# Patient Record
Sex: MALE | Race: WHITE | NOT HISPANIC OR LATINO | Employment: UNEMPLOYED | URBAN - METROPOLITAN AREA
[De-identification: names, ages, dates, MRNs, and addresses within clinical notes are randomized per-mention and may not be internally consistent; named-entity substitution may affect disease eponyms.]

---

## 2017-01-03 ENCOUNTER — GENERIC CONVERSION - ENCOUNTER (OUTPATIENT)
Dept: OTHER | Facility: OTHER | Age: 57
End: 2017-01-03

## 2017-01-06 ENCOUNTER — ALLSCRIPTS OFFICE VISIT (OUTPATIENT)
Dept: OTHER | Facility: OTHER | Age: 57
End: 2017-01-06

## 2017-01-06 ENCOUNTER — GENERIC CONVERSION - ENCOUNTER (OUTPATIENT)
Dept: OTHER | Facility: OTHER | Age: 57
End: 2017-01-06

## 2017-01-11 ENCOUNTER — HOSPITAL ENCOUNTER (OUTPATIENT)
Dept: RADIOLOGY | Facility: HOSPITAL | Age: 57
Discharge: HOME | End: 2017-01-11
Admitting: RADIOLOGY
Payer: COMMERCIAL

## 2017-01-11 DIAGNOSIS — T85.598S FEEDING TUBE DYSFUNCTION, SEQUELA: ICD-10-CM

## 2017-01-11 PROCEDURE — 49465 FLUORO EXAM OF G/COLON TUBE: CPT

## 2017-01-11 RX ADMIN — IOHEXOL 8 ML: 300 INJECTION, SOLUTION INTRAVENOUS at 09:49

## 2017-01-13 RX ORDER — SODIUM CHLORIDE 9 MG/ML
20 INJECTION, SOLUTION INTRAVENOUS CONTINUOUS
Status: DISCONTINUED | OUTPATIENT
Start: 2017-01-16 | End: 2017-01-19 | Stop reason: HOSPADM

## 2017-01-15 ENCOUNTER — TRANSCRIBE ORDERS (OUTPATIENT)
Dept: ADMINISTRATIVE | Facility: HOSPITAL | Age: 57
End: 2017-01-15

## 2017-01-15 ENCOUNTER — APPOINTMENT (OUTPATIENT)
Dept: LAB | Facility: MEDICAL CENTER | Age: 57
End: 2017-01-15
Payer: COMMERCIAL

## 2017-01-15 DIAGNOSIS — C15.3 MALIGNANT NEOPLASM OF UPPER THIRD OF ESOPHAGUS (HCC): Primary | ICD-10-CM

## 2017-01-15 DIAGNOSIS — K21.9 GASTRO-ESOPHAGEAL REFLUX DISEASE WITHOUT ESOPHAGITIS: ICD-10-CM

## 2017-01-15 DIAGNOSIS — Z00.00 ENCOUNTER FOR GENERAL ADULT MEDICAL EXAMINATION WITHOUT ABNORMAL FINDINGS: ICD-10-CM

## 2017-01-15 DIAGNOSIS — F41.9 ANXIETY DISORDER: ICD-10-CM

## 2017-01-15 DIAGNOSIS — C15.9 MALIGNANT NEOPLASM OF ESOPHAGUS (HCC): ICD-10-CM

## 2017-01-15 DIAGNOSIS — R74.8 ABNORMAL LEVELS OF OTHER SERUM ENZYMES: ICD-10-CM

## 2017-01-15 LAB
ALBUMIN SERPL BCP-MCNC: 2.8 G/DL (ref 3.5–5)
ALP SERPL-CCNC: 140 U/L (ref 46–116)
ALT SERPL W P-5'-P-CCNC: 18 U/L (ref 12–78)
ANION GAP SERPL CALCULATED.3IONS-SCNC: 6 MMOL/L (ref 4–13)
AST SERPL W P-5'-P-CCNC: 27 U/L (ref 5–45)
BASOPHILS # BLD AUTO: 0.02 THOUSANDS/ΜL (ref 0–0.1)
BASOPHILS NFR BLD AUTO: 0 % (ref 0–1)
BILIRUB SERPL-MCNC: 0.32 MG/DL (ref 0.2–1)
BUN SERPL-MCNC: 9 MG/DL (ref 5–25)
CALCIUM SERPL-MCNC: 8.7 MG/DL (ref 8.3–10.1)
CHLORIDE SERPL-SCNC: 106 MMOL/L (ref 100–108)
CO2 SERPL-SCNC: 29 MMOL/L (ref 21–32)
CREAT SERPL-MCNC: 0.77 MG/DL (ref 0.6–1.3)
EOSINOPHIL # BLD AUTO: 0.01 THOUSAND/ΜL (ref 0–0.61)
EOSINOPHIL NFR BLD AUTO: 0 % (ref 0–6)
ERYTHROCYTE [DISTWIDTH] IN BLOOD BY AUTOMATED COUNT: 15.2 % (ref 11.6–15.1)
GFR SERPL CREATININE-BSD FRML MDRD: >60 ML/MIN/1.73SQ M
GLUCOSE SERPL-MCNC: 107 MG/DL (ref 65–140)
HCT VFR BLD AUTO: 35.3 % (ref 36.5–49.3)
HGB BLD-MCNC: 11 G/DL (ref 12–17)
LYMPHOCYTES # BLD AUTO: 0.92 THOUSANDS/ΜL (ref 0.6–4.47)
LYMPHOCYTES NFR BLD AUTO: 16 % (ref 14–44)
MCH RBC QN AUTO: 29.5 PG (ref 26.8–34.3)
MCHC RBC AUTO-ENTMCNC: 31.2 G/DL (ref 31.4–37.4)
MCV RBC AUTO: 95 FL (ref 82–98)
MONOCYTES # BLD AUTO: 0.57 THOUSAND/ΜL (ref 0.17–1.22)
MONOCYTES NFR BLD AUTO: 10 % (ref 4–12)
NEUTROPHILS # BLD AUTO: 4.32 THOUSANDS/ΜL (ref 1.85–7.62)
NEUTS SEG NFR BLD AUTO: 74 % (ref 43–75)
NRBC BLD AUTO-RTO: 0 /100 WBCS
PLATELET # BLD AUTO: 287 THOUSANDS/UL (ref 149–390)
PMV BLD AUTO: 9.2 FL (ref 8.9–12.7)
POTASSIUM SERPL-SCNC: 4.4 MMOL/L (ref 3.5–5.3)
PROT SERPL-MCNC: 6.7 G/DL (ref 6.4–8.2)
RBC # BLD AUTO: 3.73 MILLION/UL (ref 3.88–5.62)
SODIUM SERPL-SCNC: 141 MMOL/L (ref 136–145)
WBC # BLD AUTO: 5.87 THOUSAND/UL (ref 4.31–10.16)

## 2017-01-15 PROCEDURE — 36415 COLL VENOUS BLD VENIPUNCTURE: CPT

## 2017-01-15 PROCEDURE — 80053 COMPREHEN METABOLIC PANEL: CPT

## 2017-01-15 PROCEDURE — 85025 COMPLETE CBC W/AUTO DIFF WBC: CPT

## 2017-01-16 ENCOUNTER — HOSPITAL ENCOUNTER (OUTPATIENT)
Dept: INFUSION CENTER | Facility: CLINIC | Age: 57
Discharge: HOME | End: 2017-01-16
Payer: COMMERCIAL

## 2017-01-16 ENCOUNTER — ALLSCRIPTS OFFICE VISIT (OUTPATIENT)
Dept: OTHER | Facility: OTHER | Age: 57
End: 2017-01-16

## 2017-01-16 VITALS
HEART RATE: 81 BPM | BODY MASS INDEX: 28.8 KG/M2 | HEIGHT: 69 IN | DIASTOLIC BLOOD PRESSURE: 83 MMHG | TEMPERATURE: 97.8 F | WEIGHT: 194.45 LBS | RESPIRATION RATE: 18 BRPM | SYSTOLIC BLOOD PRESSURE: 140 MMHG

## 2017-01-16 PROCEDURE — 96367 TX/PROPH/DG ADDL SEQ IV INF: CPT

## 2017-01-16 PROCEDURE — 96413 CHEMO IV INFUSION 1 HR: CPT

## 2017-01-16 RX ORDER — DIAZEPAM 2 MG/1
2 TABLET ORAL EVERY 12 HOURS PRN
COMMUNITY

## 2017-01-16 RX ADMIN — DOCETAXEL 151 MG: 20 INJECTION, SOLUTION, CONCENTRATE INTRAVENOUS at 10:45

## 2017-01-16 RX ADMIN — Medication 300 UNITS: at 11:57

## 2017-01-16 RX ADMIN — DEXAMETHASONE SODIUM PHOSPHATE 10 MG: 10 INJECTION INTRAMUSCULAR; INTRAVENOUS at 10:05

## 2017-01-16 RX ADMIN — SODIUM CHLORIDE 20 ML/HR: 0.9 INJECTION, SOLUTION INTRAVENOUS at 10:07

## 2017-01-16 NOTE — PROGRESS NOTES
Patient tolerated chemotherapy infusion without complication  Patient aware of upcoming appointments   Declined AVS

## 2017-01-16 NOTE — PLAN OF CARE
Problem: Potential for Falls  Goal: Patient will remain free of falls  INTERVENTIONS:  - Assess patient frequently for physical needs  - Identify cognitive and physical deficits and behaviors that affect risk of falls  - Bussey fall precautions as indicated by assessment   - Educate patient/family on patient safety including physical limitations  - Instruct patient to call for assistance with activity based on assessment  - Modify environment to reduce risk of injury  - Consider OT/PT consult to assist with strengthening/mobility   Outcome: Progressing    Problem: SAFETY ADULT  Goal: Patient will remain free of falls  INTERVENTIONS:  - Assess patient frequently for physical needs  - Identify cognitive and physical deficits and behaviors that affect risk of falls  - Bussey fall precautions as indicated by assessment   - Educate patient/family on patient safety including physical limitations  - Instruct patient to call for assistance with activity based on assessment  - Modify environment to reduce risk of injury  - Consider OT/PT consult to assist with strengthening/mobility   Outcome: Progressing    Problem: Knowledge Deficit  Goal: Patient/family/caregiver demonstrates understanding of disease process, treatment plan, medications, and discharge instructions  Complete learning assessment and assess knowledge base    Interventions:  - Provide teaching at level of understanding  - Provide teaching via preferred learning methods  Outcome: Progressing

## 2017-01-27 ENCOUNTER — HOSPITAL ENCOUNTER (OUTPATIENT)
Dept: RADIOLOGY | Facility: HOSPITAL | Age: 57
Discharge: HOME | End: 2017-01-27
Attending: INTERNAL MEDICINE
Payer: COMMERCIAL

## 2017-01-27 DIAGNOSIS — T85.598S FEEDING TUBE DYSFUNCTION, SEQUELA: ICD-10-CM

## 2017-01-27 PROCEDURE — 49451 REPLACE DUOD/JEJ TUBE PERC: CPT

## 2017-01-27 PROCEDURE — C1769 GUIDE WIRE: HCPCS

## 2017-01-27 RX ADMIN — IOHEXOL 15 ML: 300 INJECTION, SOLUTION INTRAVENOUS at 15:08

## 2017-02-01 ENCOUNTER — GENERIC CONVERSION - ENCOUNTER (OUTPATIENT)
Dept: OTHER | Facility: OTHER | Age: 57
End: 2017-02-01

## 2017-02-01 ENCOUNTER — HOSPITAL ENCOUNTER (OUTPATIENT)
Dept: CT IMAGING | Facility: HOSPITAL | Age: 57
Discharge: HOME | End: 2017-02-01
Attending: INTERNAL MEDICINE
Payer: COMMERCIAL

## 2017-02-01 DIAGNOSIS — C15.9 MALIGNANT NEOPLASM OF ESOPHAGUS (HCC): ICD-10-CM

## 2017-02-01 PROCEDURE — 74177 CT ABD & PELVIS W/CONTRAST: CPT

## 2017-02-01 PROCEDURE — 71260 CT THORAX DX C+: CPT

## 2017-02-01 RX ADMIN — IOHEXOL 100 ML: 350 INJECTION, SOLUTION INTRAVENOUS at 08:05

## 2017-02-04 ENCOUNTER — APPOINTMENT (OUTPATIENT)
Dept: LAB | Facility: MEDICAL CENTER | Age: 57
End: 2017-02-04
Payer: COMMERCIAL

## 2017-02-04 DIAGNOSIS — C15.9 MALIGNANT NEOPLASM OF ESOPHAGUS (HCC): ICD-10-CM

## 2017-02-04 LAB
ALBUMIN SERPL BCP-MCNC: 2.7 G/DL (ref 3.5–5)
ALP SERPL-CCNC: 140 U/L (ref 46–116)
ALT SERPL W P-5'-P-CCNC: 20 U/L (ref 12–78)
ANION GAP SERPL CALCULATED.3IONS-SCNC: 6 MMOL/L (ref 4–13)
AST SERPL W P-5'-P-CCNC: 25 U/L (ref 5–45)
BASOPHILS # BLD AUTO: 0.02 THOUSANDS/ΜL (ref 0–0.1)
BASOPHILS NFR BLD AUTO: 0 % (ref 0–1)
BILIRUB SERPL-MCNC: 0.45 MG/DL (ref 0.2–1)
BUN SERPL-MCNC: 12 MG/DL (ref 5–25)
CALCIUM SERPL-MCNC: 8.9 MG/DL (ref 8.3–10.1)
CHLORIDE SERPL-SCNC: 104 MMOL/L (ref 100–108)
CO2 SERPL-SCNC: 29 MMOL/L (ref 21–32)
CREAT SERPL-MCNC: 0.68 MG/DL (ref 0.6–1.3)
EOSINOPHIL # BLD AUTO: 0.01 THOUSAND/ΜL (ref 0–0.61)
EOSINOPHIL NFR BLD AUTO: 0 % (ref 0–6)
ERYTHROCYTE [DISTWIDTH] IN BLOOD BY AUTOMATED COUNT: 16.5 % (ref 11.6–15.1)
GFR SERPL CREATININE-BSD FRML MDRD: >60 ML/MIN/1.73SQ M
GLUCOSE SERPL-MCNC: 87 MG/DL (ref 65–140)
HCT VFR BLD AUTO: 32.7 % (ref 36.5–49.3)
HGB BLD-MCNC: 10.1 G/DL (ref 12–17)
LYMPHOCYTES # BLD AUTO: 0.89 THOUSANDS/ΜL (ref 0.6–4.47)
LYMPHOCYTES NFR BLD AUTO: 10 % (ref 14–44)
MCH RBC QN AUTO: 28.2 PG (ref 26.8–34.3)
MCHC RBC AUTO-ENTMCNC: 30.9 G/DL (ref 31.4–37.4)
MCV RBC AUTO: 91 FL (ref 82–98)
MONOCYTES # BLD AUTO: 0.65 THOUSAND/ΜL (ref 0.17–1.22)
MONOCYTES NFR BLD AUTO: 7 % (ref 4–12)
NEUTROPHILS # BLD AUTO: 7.53 THOUSANDS/ΜL (ref 1.85–7.62)
NEUTS SEG NFR BLD AUTO: 83 % (ref 43–75)
NRBC BLD AUTO-RTO: 0 /100 WBCS
PLATELET # BLD AUTO: 338 THOUSANDS/UL (ref 149–390)
PMV BLD AUTO: 9.2 FL (ref 8.9–12.7)
POTASSIUM SERPL-SCNC: 4.4 MMOL/L (ref 3.5–5.3)
PROT SERPL-MCNC: 6.9 G/DL (ref 6.4–8.2)
RBC # BLD AUTO: 3.58 MILLION/UL (ref 3.88–5.62)
SODIUM SERPL-SCNC: 139 MMOL/L (ref 136–145)
WBC # BLD AUTO: 9.12 THOUSAND/UL (ref 4.31–10.16)

## 2017-02-04 PROCEDURE — 80053 COMPREHEN METABOLIC PANEL: CPT

## 2017-02-04 PROCEDURE — 36415 COLL VENOUS BLD VENIPUNCTURE: CPT

## 2017-02-04 PROCEDURE — 85025 COMPLETE CBC W/AUTO DIFF WBC: CPT

## 2017-02-06 ENCOUNTER — ALLSCRIPTS OFFICE VISIT (OUTPATIENT)
Dept: OTHER | Facility: OTHER | Age: 57
End: 2017-02-06

## 2017-02-08 ENCOUNTER — ALLSCRIPTS OFFICE VISIT (OUTPATIENT)
Dept: OTHER | Facility: OTHER | Age: 57
End: 2017-02-08

## 2017-02-14 ENCOUNTER — HOSPITAL ENCOUNTER (INPATIENT)
Facility: HOSPITAL | Age: 57
LOS: 8 days | Discharge: HOME HEALTH | DRG: 871 | End: 2017-02-23
Attending: EMERGENCY MEDICINE | Admitting: INTERNAL MEDICINE
Payer: COMMERCIAL

## 2017-02-14 ENCOUNTER — APPOINTMENT (EMERGENCY)
Dept: RADIOLOGY | Facility: HOSPITAL | Age: 57
DRG: 871 | End: 2017-02-14
Payer: COMMERCIAL

## 2017-02-14 DIAGNOSIS — J18.9 PNEUMONIA OF BOTH LUNGS DUE TO INFECTIOUS ORGANISM, UNSPECIFIED PART OF LUNG: ICD-10-CM

## 2017-02-14 DIAGNOSIS — R93.89 ABNORMAL CHEST CT: ICD-10-CM

## 2017-02-14 DIAGNOSIS — R09.02 HYPOXIA: ICD-10-CM

## 2017-02-14 DIAGNOSIS — I21.4 NSTEMI (NON-ST ELEVATED MYOCARDIAL INFARCTION) (HCC): ICD-10-CM

## 2017-02-14 DIAGNOSIS — I63.9 ACUTE CVA (CEREBROVASCULAR ACCIDENT) (HCC): ICD-10-CM

## 2017-02-14 DIAGNOSIS — A41.9 SEPSIS (HCC): Primary | ICD-10-CM

## 2017-02-14 DIAGNOSIS — J18.9 PNEUMONIA INVOLVING RIGHT LUNG: ICD-10-CM

## 2017-02-14 LAB
ALBUMIN SERPL BCP-MCNC: 2.5 G/DL (ref 3.5–5)
ALP SERPL-CCNC: 188 U/L (ref 46–116)
ALT SERPL W P-5'-P-CCNC: 25 U/L (ref 12–78)
AMMONIA PLAS-SCNC: <10 UMOL/L (ref 11–35)
ANION GAP SERPL CALCULATED.3IONS-SCNC: 9 MMOL/L (ref 4–13)
AST SERPL W P-5'-P-CCNC: 53 U/L (ref 5–45)
BASE EXCESS BLDA CALC-SCNC: -0.2 MMOL/L
BASOPHILS # BLD AUTO: 0.03 THOUSANDS/ΜL (ref 0–0.1)
BASOPHILS NFR BLD AUTO: 0 % (ref 0–1)
BILIRUB SERPL-MCNC: 0.66 MG/DL (ref 0.2–1)
BUN SERPL-MCNC: 13 MG/DL (ref 5–25)
CALCIUM SERPL-MCNC: 8.1 MG/DL (ref 8.3–10.1)
CHLORIDE SERPL-SCNC: 98 MMOL/L (ref 100–108)
CO2 SERPL-SCNC: 29 MMOL/L (ref 21–32)
CREAT SERPL-MCNC: 0.95 MG/DL (ref 0.6–1.3)
EOSINOPHIL # BLD AUTO: 0.24 THOUSAND/ΜL (ref 0–0.61)
EOSINOPHIL NFR BLD AUTO: 2 % (ref 0–6)
ERYTHROCYTE [DISTWIDTH] IN BLOOD BY AUTOMATED COUNT: 17.2 % (ref 11.6–15.1)
GFR SERPL CREATININE-BSD FRML MDRD: >60 ML/MIN/1.73SQ M
GLUCOSE SERPL-MCNC: 114 MG/DL (ref 65–140)
GLUCOSE SERPL-MCNC: 121 MG/DL (ref 65–140)
HCO3 BLDA-SCNC: 23.8 MMOL/L (ref 22–28)
HCT VFR BLD AUTO: 31.3 % (ref 36.5–49.3)
HGB BLD-MCNC: 10.1 G/DL (ref 12–17)
INR PPP: 1.25 (ref 0.86–1.16)
LACTATE SERPL-SCNC: 1.9 MMOL/L (ref 0.5–2)
LYMPHOCYTES # BLD AUTO: 0.68 THOUSANDS/ΜL (ref 0.6–4.47)
LYMPHOCYTES NFR BLD AUTO: 7 % (ref 14–44)
MAGNESIUM SERPL-MCNC: 2.2 MG/DL (ref 1.6–2.6)
MCH RBC QN AUTO: 28.6 PG (ref 26.8–34.3)
MCHC RBC AUTO-ENTMCNC: 32.3 G/DL (ref 31.4–37.4)
MCV RBC AUTO: 89 FL (ref 82–98)
MONOCYTES # BLD AUTO: 0.9 THOUSAND/ΜL (ref 0.17–1.22)
MONOCYTES NFR BLD AUTO: 9 % (ref 4–12)
NASAL CANNULA: 4.5
NEUTROPHILS # BLD AUTO: 8.49 THOUSANDS/ΜL (ref 1.85–7.62)
NEUTS SEG NFR BLD AUTO: 82 % (ref 43–75)
NRBC BLD AUTO-RTO: 0 /100 WBCS
O2 CT BLDA-SCNC: 12.3 ML/DL (ref 16–23)
OXYHGB MFR BLDA: 89.5 % (ref 94–97)
PCO2 BLDA: 36.1 MM HG (ref 36–44)
PH BLDA: 7.44 [PH] (ref 7.35–7.45)
PLATELET # BLD AUTO: 196 THOUSANDS/UL (ref 149–390)
PMV BLD AUTO: 9.4 FL (ref 8.9–12.7)
PO2 BLDA: 66.9 MM HG (ref 75–129)
POTASSIUM SERPL-SCNC: 3.9 MMOL/L (ref 3.5–5.3)
PROT SERPL-MCNC: 6.9 G/DL (ref 6.4–8.2)
PROTHROMBIN TIME: 15.6 SECONDS (ref 12–14.3)
RBC # BLD AUTO: 3.53 MILLION/UL (ref 3.88–5.62)
SODIUM SERPL-SCNC: 136 MMOL/L (ref 136–145)
SPECIMEN SOURCE: ABNORMAL
TROPONIN I SERPL-MCNC: 3.49 NG/ML
WBC # BLD AUTO: 10.34 THOUSAND/UL (ref 4.31–10.16)

## 2017-02-14 PROCEDURE — 80053 COMPREHEN METABOLIC PANEL: CPT | Performed by: EMERGENCY MEDICINE

## 2017-02-14 PROCEDURE — 82140 ASSAY OF AMMONIA: CPT | Performed by: EMERGENCY MEDICINE

## 2017-02-14 PROCEDURE — 82805 BLOOD GASES W/O2 SATURATION: CPT | Performed by: EMERGENCY MEDICINE

## 2017-02-14 PROCEDURE — 84484 ASSAY OF TROPONIN QUANT: CPT | Performed by: EMERGENCY MEDICINE

## 2017-02-14 PROCEDURE — 85610 PROTHROMBIN TIME: CPT | Performed by: EMERGENCY MEDICINE

## 2017-02-14 PROCEDURE — 96365 THER/PROPH/DIAG IV INF INIT: CPT

## 2017-02-14 PROCEDURE — 71010 HB CHEST X-RAY 1 VIEW FRONTAL (PORTABLE): CPT

## 2017-02-14 PROCEDURE — 93005 ELECTROCARDIOGRAM TRACING: CPT | Performed by: EMERGENCY MEDICINE

## 2017-02-14 PROCEDURE — 87040 BLOOD CULTURE FOR BACTERIA: CPT | Performed by: EMERGENCY MEDICINE

## 2017-02-14 PROCEDURE — 83735 ASSAY OF MAGNESIUM: CPT | Performed by: EMERGENCY MEDICINE

## 2017-02-14 PROCEDURE — 82948 REAGENT STRIP/BLOOD GLUCOSE: CPT

## 2017-02-14 PROCEDURE — 85025 COMPLETE CBC W/AUTO DIFF WBC: CPT | Performed by: EMERGENCY MEDICINE

## 2017-02-14 PROCEDURE — 36415 COLL VENOUS BLD VENIPUNCTURE: CPT | Performed by: EMERGENCY MEDICINE

## 2017-02-14 PROCEDURE — 83605 ASSAY OF LACTIC ACID: CPT | Performed by: EMERGENCY MEDICINE

## 2017-02-14 PROCEDURE — 96361 HYDRATE IV INFUSION ADD-ON: CPT

## 2017-02-14 RX ORDER — OXYCODONE HCL 20 MG/1
20 TABLET, FILM COATED, EXTENDED RELEASE ORAL EVERY 4 HOURS
COMMUNITY
End: 2017-03-14

## 2017-02-14 RX ORDER — ASPIRIN 300 MG/1
300 SUPPOSITORY RECTAL ONCE
Status: COMPLETED | OUTPATIENT
Start: 2017-02-14 | End: 2017-02-14

## 2017-02-14 RX ORDER — SUCRALFATE ORAL 1 G/10ML
1 SUSPENSION ORAL 2 TIMES DAILY
COMMUNITY
Start: 2016-11-02

## 2017-02-14 RX ORDER — ACETAMINOPHEN 160 MG/5ML
650 SUSPENSION, ORAL (FINAL DOSE FORM) ORAL ONCE
Status: COMPLETED | OUTPATIENT
Start: 2017-02-14 | End: 2017-02-14

## 2017-02-14 RX ORDER — DOCUSATE SODIUM 100 MG/1
100 CAPSULE, LIQUID FILLED ORAL AS NEEDED
COMMUNITY
End: 2017-03-14

## 2017-02-14 RX ADMIN — CEFEPIME 2000 MG: 2 INJECTION, POWDER, FOR SOLUTION INTRAMUSCULAR; INTRAVENOUS at 23:45

## 2017-02-14 RX ADMIN — ACETAMINOPHEN 650 MG: 160 SUSPENSION ORAL at 23:04

## 2017-02-14 RX ADMIN — SODIUM CHLORIDE 1000 ML: 0.9 INJECTION, SOLUTION INTRAVENOUS at 23:54

## 2017-02-14 RX ADMIN — IBUPROFEN 400 MG: 100 SUSPENSION ORAL at 23:10

## 2017-02-14 RX ADMIN — SODIUM CHLORIDE 1000 ML: 0.9 INJECTION, SOLUTION INTRAVENOUS at 22:33

## 2017-02-14 RX ADMIN — ASPIRIN 300 MG: 300 SUPPOSITORY RECTAL at 23:45

## 2017-02-15 ENCOUNTER — APPOINTMENT (INPATIENT)
Dept: CT IMAGING | Facility: HOSPITAL | Age: 57
DRG: 871 | End: 2017-02-15
Payer: COMMERCIAL

## 2017-02-15 PROBLEM — J18.9 PNEUMONIA: Status: ACTIVE | Noted: 2017-02-15

## 2017-02-15 PROBLEM — R41.82 ALTERED MENTAL STATUS: Status: ACTIVE | Noted: 2017-02-15

## 2017-02-15 LAB
ALBUMIN SERPL BCP-MCNC: 1.9 G/DL (ref 3.5–5)
ALP SERPL-CCNC: 142 U/L (ref 46–116)
ALT SERPL W P-5'-P-CCNC: 19 U/L (ref 12–78)
ANION GAP SERPL CALCULATED.3IONS-SCNC: 6 MMOL/L (ref 4–13)
AST SERPL W P-5'-P-CCNC: 52 U/L (ref 5–45)
ATRIAL RATE: 119 BPM
BASOPHILS # BLD AUTO: 0.03 THOUSANDS/ΜL (ref 0–0.1)
BASOPHILS NFR BLD AUTO: 0 % (ref 0–1)
BILIRUB SERPL-MCNC: 0.62 MG/DL (ref 0.2–1)
BILIRUB UR QL STRIP: NEGATIVE
BUN SERPL-MCNC: 11 MG/DL (ref 5–25)
CA-I BLD-SCNC: 1.06 MMOL/L (ref 1.12–1.32)
CALCIUM SERPL-MCNC: 7.2 MG/DL (ref 8.3–10.1)
CHLORIDE SERPL-SCNC: 107 MMOL/L (ref 100–108)
CLARITY UR: CLEAR
CO2 SERPL-SCNC: 28 MMOL/L (ref 21–32)
COLOR UR: YELLOW
CREAT SERPL-MCNC: 0.74 MG/DL (ref 0.6–1.3)
EOSINOPHIL # BLD AUTO: 0.3 THOUSAND/ΜL (ref 0–0.61)
EOSINOPHIL NFR BLD AUTO: 4 % (ref 0–6)
ERYTHROCYTE [DISTWIDTH] IN BLOOD BY AUTOMATED COUNT: 17.1 % (ref 11.6–15.1)
FLUAV AG SPEC QL IA: NEGATIVE
FLUAV AG SPEC QL: NORMAL
FLUBV AG SPEC QL IA: NEGATIVE
FLUBV AG SPEC QL: NORMAL
GFR SERPL CREATININE-BSD FRML MDRD: >60 ML/MIN/1.73SQ M
GLUCOSE SERPL-MCNC: 102 MG/DL (ref 65–140)
GLUCOSE UR STRIP-MCNC: NEGATIVE MG/DL
HCT VFR BLD AUTO: 27.6 % (ref 36.5–49.3)
HGB BLD-MCNC: 8.7 G/DL (ref 12–17)
HGB UR QL STRIP.AUTO: NEGATIVE
KETONES UR STRIP-MCNC: NEGATIVE MG/DL
LACTATE SERPL-SCNC: 0.8 MMOL/L (ref 0.5–2)
LEUKOCYTE ESTERASE UR QL STRIP: NEGATIVE
LYMPHOCYTES # BLD AUTO: 0.53 THOUSANDS/ΜL (ref 0.6–4.47)
LYMPHOCYTES NFR BLD AUTO: 6 % (ref 14–44)
MAGNESIUM SERPL-MCNC: 2.1 MG/DL (ref 1.6–2.6)
MCH RBC QN AUTO: 27.9 PG (ref 26.8–34.3)
MCHC RBC AUTO-ENTMCNC: 31.5 G/DL (ref 31.4–37.4)
MCV RBC AUTO: 89 FL (ref 82–98)
MONOCYTES # BLD AUTO: 0.55 THOUSAND/ΜL (ref 0.17–1.22)
MONOCYTES NFR BLD AUTO: 7 % (ref 4–12)
NEUTROPHILS # BLD AUTO: 6.83 THOUSANDS/ΜL (ref 1.85–7.62)
NEUTS SEG NFR BLD AUTO: 83 % (ref 43–75)
NITRITE UR QL STRIP: NEGATIVE
NRBC BLD AUTO-RTO: 0 /100 WBCS
P AXIS: 62 DEGREES
PH UR STRIP.AUTO: 6 [PH] (ref 4.5–8)
PHOSPHATE SERPL-MCNC: 3.6 MG/DL (ref 2.7–4.5)
PLATELET # BLD AUTO: 131 THOUSANDS/UL (ref 149–390)
PMV BLD AUTO: 9.6 FL (ref 8.9–12.7)
POTASSIUM SERPL-SCNC: 4.1 MMOL/L (ref 3.5–5.3)
PR INTERVAL: 124 MS
PROT SERPL-MCNC: 5.3 G/DL (ref 6.4–8.2)
PROT UR STRIP-MCNC: NEGATIVE MG/DL
QRS AXIS: 61 DEGREES
QRSD INTERVAL: 80 MS
QT INTERVAL: 298 MS
QTC INTERVAL: 419 MS
RBC # BLD AUTO: 3.12 MILLION/UL (ref 3.88–5.62)
RSV B RNA SPEC QL NAA+PROBE: NORMAL
SODIUM SERPL-SCNC: 141 MMOL/L (ref 136–145)
SP GR UR STRIP.AUTO: <=1.005 (ref 1–1.03)
T WAVE AXIS: 24 DEGREES
TROPONIN I SERPL-MCNC: 3.39 NG/ML
TROPONIN I SERPL-MCNC: 4.8 NG/ML
UROBILINOGEN UR QL STRIP.AUTO: 0.2 E.U./DL
VENTRICULAR RATE: 119 BPM
WBC # BLD AUTO: 8.24 THOUSAND/UL (ref 4.31–10.16)

## 2017-02-15 PROCEDURE — 80053 COMPREHEN METABOLIC PANEL: CPT | Performed by: NURSE PRACTITIONER

## 2017-02-15 PROCEDURE — 87400 INFLUENZA A/B EACH AG IA: CPT | Performed by: NURSE PRACTITIONER

## 2017-02-15 PROCEDURE — 81003 URINALYSIS AUTO W/O SCOPE: CPT | Performed by: EMERGENCY MEDICINE

## 2017-02-15 PROCEDURE — 93005 ELECTROCARDIOGRAM TRACING: CPT | Performed by: NURSE PRACTITIONER

## 2017-02-15 PROCEDURE — 83735 ASSAY OF MAGNESIUM: CPT | Performed by: NURSE PRACTITIONER

## 2017-02-15 PROCEDURE — 82330 ASSAY OF CALCIUM: CPT | Performed by: NURSE PRACTITIONER

## 2017-02-15 PROCEDURE — 85025 COMPLETE CBC W/AUTO DIFF WBC: CPT | Performed by: NURSE PRACTITIONER

## 2017-02-15 PROCEDURE — 96367 TX/PROPH/DG ADDL SEQ IV INF: CPT

## 2017-02-15 PROCEDURE — 83605 ASSAY OF LACTIC ACID: CPT | Performed by: EMERGENCY MEDICINE

## 2017-02-15 PROCEDURE — 70450 CT HEAD/BRAIN W/O DYE: CPT

## 2017-02-15 PROCEDURE — 84100 ASSAY OF PHOSPHORUS: CPT | Performed by: NURSE PRACTITIONER

## 2017-02-15 PROCEDURE — 87798 DETECT AGENT NOS DNA AMP: CPT | Performed by: NURSE PRACTITIONER

## 2017-02-15 PROCEDURE — 84484 ASSAY OF TROPONIN QUANT: CPT | Performed by: INTERNAL MEDICINE

## 2017-02-15 PROCEDURE — 84484 ASSAY OF TROPONIN QUANT: CPT | Performed by: NURSE PRACTITIONER

## 2017-02-15 PROCEDURE — 36415 COLL VENOUS BLD VENIPUNCTURE: CPT | Performed by: EMERGENCY MEDICINE

## 2017-02-15 PROCEDURE — 99285 EMERGENCY DEPT VISIT HI MDM: CPT

## 2017-02-15 PROCEDURE — 0T9B70Z DRAINAGE OF BLADDER WITH DRAINAGE DEVICE, VIA NATURAL OR ARTIFICIAL OPENING: ICD-10-PCS | Performed by: INTERNAL MEDICINE

## 2017-02-15 RX ORDER — OXYCODONE HCL 20 MG/1
20 TABLET, FILM COATED, EXTENDED RELEASE ORAL EVERY 12 HOURS SCHEDULED
Status: DISCONTINUED | OUTPATIENT
Start: 2017-02-15 | End: 2017-02-23 | Stop reason: HOSPADM

## 2017-02-15 RX ORDER — SENNOSIDES 8.6 MG
2 TABLET ORAL 2 TIMES DAILY
Status: DISCONTINUED | OUTPATIENT
Start: 2017-02-15 | End: 2017-02-23 | Stop reason: HOSPADM

## 2017-02-15 RX ORDER — SUCRALFATE ORAL 1 G/10ML
1000 SUSPENSION ORAL
Status: DISCONTINUED | OUTPATIENT
Start: 2017-02-15 | End: 2017-02-23 | Stop reason: HOSPADM

## 2017-02-15 RX ORDER — HEPARIN SODIUM 5000 [USP'U]/ML
5000 INJECTION, SOLUTION INTRAVENOUS; SUBCUTANEOUS EVERY 8 HOURS SCHEDULED
Status: DISCONTINUED | OUTPATIENT
Start: 2017-02-15 | End: 2017-02-23 | Stop reason: HOSPADM

## 2017-02-15 RX ORDER — SODIUM CHLORIDE, SODIUM LACTATE, POTASSIUM CHLORIDE, CALCIUM CHLORIDE 600; 310; 30; 20 MG/100ML; MG/100ML; MG/100ML; MG/100ML
75 INJECTION, SOLUTION INTRAVENOUS CONTINUOUS
Status: CANCELLED | OUTPATIENT
Start: 2017-02-17

## 2017-02-15 RX ORDER — SODIUM CHLORIDE 9 MG/ML
100 INJECTION, SOLUTION INTRAVENOUS CONTINUOUS
Status: DISCONTINUED | OUTPATIENT
Start: 2017-02-15 | End: 2017-02-15

## 2017-02-15 RX ORDER — GABAPENTIN 100 MG/1
100 CAPSULE ORAL 2 TIMES DAILY
Status: DISCONTINUED | OUTPATIENT
Start: 2017-02-15 | End: 2017-02-16

## 2017-02-15 RX ORDER — GABAPENTIN 100 MG/1
300 CAPSULE ORAL 2 TIMES DAILY
COMMUNITY

## 2017-02-15 RX ORDER — DIAZEPAM 5 MG/1
5 TABLET ORAL EVERY 12 HOURS PRN
Status: DISCONTINUED | OUTPATIENT
Start: 2017-02-15 | End: 2017-02-23 | Stop reason: HOSPADM

## 2017-02-15 RX ORDER — OXYCODONE HYDROCHLORIDE 5 MG/1
10 TABLET ORAL EVERY 4 HOURS PRN
Status: DISCONTINUED | OUTPATIENT
Start: 2017-02-15 | End: 2017-02-23 | Stop reason: HOSPADM

## 2017-02-15 RX ORDER — DOCUSATE SODIUM 100 MG/1
100 CAPSULE, LIQUID FILLED ORAL AS NEEDED
Status: DISCONTINUED | OUTPATIENT
Start: 2017-02-15 | End: 2017-02-23 | Stop reason: HOSPADM

## 2017-02-15 RX ORDER — FAMOTIDINE 20 MG/1
20 TABLET, FILM COATED ORAL 2 TIMES DAILY
Status: DISCONTINUED | OUTPATIENT
Start: 2017-02-15 | End: 2017-02-23 | Stop reason: HOSPADM

## 2017-02-15 RX ORDER — ACETAMINOPHEN 325 MG/1
650 TABLET ORAL EVERY 6 HOURS PRN
Status: DISCONTINUED | OUTPATIENT
Start: 2017-02-15 | End: 2017-02-23 | Stop reason: HOSPADM

## 2017-02-15 RX ORDER — PANTOPRAZOLE SODIUM 40 MG/1
40 TABLET, DELAYED RELEASE ORAL
Status: DISCONTINUED | OUTPATIENT
Start: 2017-02-15 | End: 2017-02-23 | Stop reason: HOSPADM

## 2017-02-15 RX ORDER — LANOLIN ALCOHOL/MO/W.PET/CERES
6 CREAM (GRAM) TOPICAL
Status: DISCONTINUED | OUTPATIENT
Start: 2017-02-15 | End: 2017-02-23 | Stop reason: HOSPADM

## 2017-02-15 RX ADMIN — SODIUM CHLORIDE 100 ML/HR: 0.9 INJECTION, SOLUTION INTRAVENOUS at 03:20

## 2017-02-15 RX ADMIN — ACETAMINOPHEN 650 MG: 325 TABLET, FILM COATED ORAL at 15:55

## 2017-02-15 RX ADMIN — OXYCODONE HYDROCHLORIDE 20 MG: 20 TABLET, FILM COATED, EXTENDED RELEASE ORAL at 21:43

## 2017-02-15 RX ADMIN — OXYCODONE HYDROCHLORIDE 10 MG: 5 TABLET ORAL at 09:52

## 2017-02-15 RX ADMIN — PIPERACILLIN SODIUM,TAZOBACTAM SODIUM 3.38 G: 3; .375 INJECTION, POWDER, FOR SOLUTION INTRAVENOUS at 03:43

## 2017-02-15 RX ADMIN — HEPARIN SODIUM 5000 UNITS: 5000 INJECTION, SOLUTION INTRAVENOUS; SUBCUTANEOUS at 13:20

## 2017-02-15 RX ADMIN — SUCRALFATE 1000 MG: 1 SUSPENSION ORAL at 15:55

## 2017-02-15 RX ADMIN — PIPERACILLIN SODIUM,TAZOBACTAM SODIUM 3.38 G: 3; .375 INJECTION, POWDER, FOR SOLUTION INTRAVENOUS at 20:46

## 2017-02-15 RX ADMIN — OXYCODONE HYDROCHLORIDE 10 MG: 5 TABLET ORAL at 17:42

## 2017-02-15 RX ADMIN — FAMOTIDINE 20 MG: 20 TABLET ORAL at 17:03

## 2017-02-15 RX ADMIN — OXYCODONE HYDROCHLORIDE 20 MG: 20 TABLET, FILM COATED, EXTENDED RELEASE ORAL at 09:52

## 2017-02-15 RX ADMIN — DOCUSATE SODIUM 100 MG: 100 CAPSULE, LIQUID FILLED ORAL at 09:53

## 2017-02-15 RX ADMIN — PIPERACILLIN SODIUM,TAZOBACTAM SODIUM 3.38 G: 3; .375 INJECTION, POWDER, FOR SOLUTION INTRAVENOUS at 11:08

## 2017-02-15 RX ADMIN — SENNOSIDES 17.2 MG: 8.6 TABLET, FILM COATED ORAL at 09:53

## 2017-02-15 RX ADMIN — HEPARIN SODIUM 5000 UNITS: 5000 INJECTION, SOLUTION INTRAVENOUS; SUBCUTANEOUS at 06:35

## 2017-02-15 RX ADMIN — GABAPENTIN 100 MG: 100 CAPSULE ORAL at 18:31

## 2017-02-15 RX ADMIN — MELATONIN TAB 3 MG 6 MG: 3 TAB at 21:43

## 2017-02-15 RX ADMIN — AZITHROMYCIN MONOHYDRATE 500 MG: 500 INJECTION, POWDER, LYOPHILIZED, FOR SOLUTION INTRAVENOUS at 04:27

## 2017-02-15 RX ADMIN — VANCOMYCIN HYDROCHLORIDE 1250 MG: 1 INJECTION, POWDER, LYOPHILIZED, FOR SOLUTION INTRAVENOUS at 01:04

## 2017-02-15 RX ADMIN — CALCIUM GLUCONATE 1 G: 94 INJECTION, SOLUTION INTRAVENOUS at 09:00

## 2017-02-15 RX ADMIN — SENNOSIDES 17.2 MG: 8.6 TABLET, FILM COATED ORAL at 17:03

## 2017-02-15 RX ADMIN — HEPARIN SODIUM 5000 UNITS: 5000 INJECTION, SOLUTION INTRAVENOUS; SUBCUTANEOUS at 21:43

## 2017-02-16 ENCOUNTER — ANESTHESIA EVENT (OUTPATIENT)
Dept: GASTROENTEROLOGY | Facility: HOSPITAL | Age: 57
End: 2017-02-16

## 2017-02-16 ENCOUNTER — APPOINTMENT (INPATIENT)
Dept: MRI IMAGING | Facility: HOSPITAL | Age: 57
DRG: 871 | End: 2017-02-16
Payer: COMMERCIAL

## 2017-02-16 ENCOUNTER — APPOINTMENT (INPATIENT)
Dept: NON INVASIVE DIAGNOSTICS | Facility: HOSPITAL | Age: 57
DRG: 871 | End: 2017-02-16
Payer: COMMERCIAL

## 2017-02-16 PROBLEM — K21.9 GERD (GASTROESOPHAGEAL REFLUX DISEASE): Status: ACTIVE | Noted: 2017-02-16

## 2017-02-16 PROBLEM — G93.40 ACUTE ENCEPHALOPATHY: Status: ACTIVE | Noted: 2017-02-15

## 2017-02-16 PROBLEM — K94.23 MALFUNCTION OF PERCUTANEOUS ENDOSCOPIC GASTROSTOMY (PEG) TUBE (HCC): Status: ACTIVE | Noted: 2017-02-16

## 2017-02-16 PROBLEM — K94.13 JEJUNOSTOMY MALFUNCTION (HCC): Status: ACTIVE | Noted: 2017-02-16

## 2017-02-16 PROBLEM — E44.0 MALNUTRITION OF MODERATE DEGREE (HCC): Status: ACTIVE | Noted: 2017-02-16

## 2017-02-16 PROBLEM — R33.9 URINARY RETENTION: Status: ACTIVE | Noted: 2017-02-16

## 2017-02-16 PROBLEM — R79.89 ELEVATED TROPONIN: Status: ACTIVE | Noted: 2017-02-16

## 2017-02-16 PROBLEM — R77.8 ELEVATED TROPONIN: Status: ACTIVE | Noted: 2017-02-16

## 2017-02-16 LAB
ALBUMIN SERPL BCP-MCNC: 1.8 G/DL (ref 3.5–5)
ALP SERPL-CCNC: 173 U/L (ref 46–116)
ALT SERPL W P-5'-P-CCNC: 17 U/L (ref 12–78)
ANION GAP SERPL CALCULATED.3IONS-SCNC: 8 MMOL/L (ref 4–13)
AST SERPL W P-5'-P-CCNC: 40 U/L (ref 5–45)
ATRIAL RATE: 102 BPM
BILIRUB SERPL-MCNC: 0.42 MG/DL (ref 0.2–1)
BUN SERPL-MCNC: 10 MG/DL (ref 5–25)
CALCIUM SERPL-MCNC: 7.5 MG/DL (ref 8.3–10.1)
CHLORIDE SERPL-SCNC: 105 MMOL/L (ref 100–108)
CO2 SERPL-SCNC: 26 MMOL/L (ref 21–32)
CREAT SERPL-MCNC: 0.73 MG/DL (ref 0.6–1.3)
ERYTHROCYTE [DISTWIDTH] IN BLOOD BY AUTOMATED COUNT: 17.3 % (ref 11.6–15.1)
GFR SERPL CREATININE-BSD FRML MDRD: >60 ML/MIN/1.73SQ M
GLUCOSE SERPL-MCNC: 143 MG/DL (ref 65–140)
HCT VFR BLD AUTO: 31 % (ref 36.5–49.3)
HGB BLD-MCNC: 9.9 G/DL (ref 12–17)
MCH RBC QN AUTO: 28.3 PG (ref 26.8–34.3)
MCHC RBC AUTO-ENTMCNC: 31.9 G/DL (ref 31.4–37.4)
MCV RBC AUTO: 89 FL (ref 82–98)
NT-PROBNP SERPL-MCNC: 2116 PG/ML
P AXIS: 53 DEGREES
PLATELET # BLD AUTO: 123 THOUSANDS/UL (ref 149–390)
PMV BLD AUTO: 9.4 FL (ref 8.9–12.7)
POTASSIUM SERPL-SCNC: 3.8 MMOL/L (ref 3.5–5.3)
PR INTERVAL: 138 MS
PROT SERPL-MCNC: 5.6 G/DL (ref 6.4–8.2)
QRS AXIS: 46 DEGREES
QRSD INTERVAL: 82 MS
QT INTERVAL: 362 MS
QTC INTERVAL: 471 MS
RBC # BLD AUTO: 3.5 MILLION/UL (ref 3.88–5.62)
SODIUM SERPL-SCNC: 139 MMOL/L (ref 136–145)
T WAVE AXIS: 16 DEGREES
VENTRICULAR RATE: 102 BPM
WBC # BLD AUTO: 10.44 THOUSAND/UL (ref 4.31–10.16)

## 2017-02-16 PROCEDURE — 85027 COMPLETE CBC AUTOMATED: CPT | Performed by: INTERNAL MEDICINE

## 2017-02-16 PROCEDURE — 83880 ASSAY OF NATRIURETIC PEPTIDE: CPT | Performed by: INTERNAL MEDICINE

## 2017-02-16 PROCEDURE — 93306 TTE W/DOPPLER COMPLETE: CPT

## 2017-02-16 PROCEDURE — 70553 MRI BRAIN STEM W/O & W/DYE: CPT

## 2017-02-16 PROCEDURE — 80053 COMPREHEN METABOLIC PANEL: CPT | Performed by: INTERNAL MEDICINE

## 2017-02-16 RX ORDER — GABAPENTIN 100 MG/1
100 CAPSULE ORAL DAILY
Status: DISCONTINUED | OUTPATIENT
Start: 2017-02-17 | End: 2017-02-23 | Stop reason: HOSPADM

## 2017-02-16 RX ORDER — TAMSULOSIN HYDROCHLORIDE 0.4 MG/1
0.4 CAPSULE ORAL
Status: DISCONTINUED | OUTPATIENT
Start: 2017-02-16 | End: 2017-02-23 | Stop reason: HOSPADM

## 2017-02-16 RX ORDER — GABAPENTIN 250 MG/5ML
50 SOLUTION ORAL
Status: DISCONTINUED | OUTPATIENT
Start: 2017-02-16 | End: 2017-02-23 | Stop reason: HOSPADM

## 2017-02-16 RX ORDER — LORAZEPAM 2 MG/ML
1.5 INJECTION INTRAMUSCULAR ONCE
Status: COMPLETED | OUTPATIENT
Start: 2017-02-16 | End: 2017-02-16

## 2017-02-16 RX ADMIN — SUCRALFATE 1000 MG: 1 SUSPENSION ORAL at 16:07

## 2017-02-16 RX ADMIN — PIPERACILLIN SODIUM,TAZOBACTAM SODIUM 3.38 G: 3; .375 INJECTION, POWDER, FOR SOLUTION INTRAVENOUS at 11:18

## 2017-02-16 RX ADMIN — TAMSULOSIN HYDROCHLORIDE 0.4 MG: 0.4 CAPSULE ORAL at 16:07

## 2017-02-16 RX ADMIN — HEPARIN SODIUM 5000 UNITS: 5000 INJECTION, SOLUTION INTRAVENOUS; SUBCUTANEOUS at 13:09

## 2017-02-16 RX ADMIN — SUCRALFATE 1000 MG: 1 SUSPENSION ORAL at 07:21

## 2017-02-16 RX ADMIN — OXYCODONE HYDROCHLORIDE 20 MG: 20 TABLET, FILM COATED, EXTENDED RELEASE ORAL at 20:02

## 2017-02-16 RX ADMIN — MELATONIN TAB 3 MG 6 MG: 3 TAB at 22:08

## 2017-02-16 RX ADMIN — OXYCODONE HYDROCHLORIDE 10 MG: 5 TABLET ORAL at 13:52

## 2017-02-16 RX ADMIN — PIPERACILLIN SODIUM,TAZOBACTAM SODIUM 3.38 G: 3; .375 INJECTION, POWDER, FOR SOLUTION INTRAVENOUS at 03:03

## 2017-02-16 RX ADMIN — FAMOTIDINE 20 MG: 20 TABLET ORAL at 08:03

## 2017-02-16 RX ADMIN — OXYCODONE HYDROCHLORIDE 20 MG: 20 TABLET, FILM COATED, EXTENDED RELEASE ORAL at 08:03

## 2017-02-16 RX ADMIN — OXYCODONE HYDROCHLORIDE 10 MG: 5 TABLET ORAL at 18:17

## 2017-02-16 RX ADMIN — PANTOPRAZOLE SODIUM 40 MG: 40 TABLET, DELAYED RELEASE ORAL at 05:39

## 2017-02-16 RX ADMIN — HEPARIN SODIUM 5000 UNITS: 5000 INJECTION, SOLUTION INTRAVENOUS; SUBCUTANEOUS at 05:39

## 2017-02-16 RX ADMIN — HEPARIN SODIUM 5000 UNITS: 5000 INJECTION, SOLUTION INTRAVENOUS; SUBCUTANEOUS at 22:08

## 2017-02-16 RX ADMIN — TAMSULOSIN HYDROCHLORIDE 0.4 MG: 0.4 CAPSULE ORAL at 03:52

## 2017-02-16 RX ADMIN — SENNOSIDES 17.2 MG: 8.6 TABLET, FILM COATED ORAL at 17:41

## 2017-02-16 RX ADMIN — GABAPENTIN 50 MG: 250 SUSPENSION ORAL at 22:08

## 2017-02-16 RX ADMIN — FAMOTIDINE 20 MG: 20 TABLET ORAL at 17:41

## 2017-02-16 RX ADMIN — LORAZEPAM 1.5 MG: 2 INJECTION INTRAMUSCULAR; INTRAVENOUS at 23:57

## 2017-02-16 RX ADMIN — DIAZEPAM 5 MG: 5 TABLET ORAL at 18:17

## 2017-02-16 RX ADMIN — SENNOSIDES 17.2 MG: 8.6 TABLET, FILM COATED ORAL at 08:03

## 2017-02-16 RX ADMIN — GABAPENTIN 100 MG: 100 CAPSULE ORAL at 08:03

## 2017-02-16 RX ADMIN — PIPERACILLIN SODIUM,TAZOBACTAM SODIUM 3.38 G: 3; .375 INJECTION, POWDER, FOR SOLUTION INTRAVENOUS at 19:14

## 2017-02-16 NOTE — ANESTHESIA PREPROCEDURE EVALUATION
Patient summary reviewed and Nursing notes reviewed  No history of anesthetic complications  Mallampati: III  TM distance: >3 FB  Neck ROM: full    PULMONARY  Patient's breath sounds clear to auscultation. PULMONARY Positives: pneumonia    NEURO/PSYCHNEURO/PSYCH Positives: psychiatric history  Additional Neuro/Psych Comments: Encephalopathy  Chemo induced neuropathy    ENDO/OTHER  Additional Endo/Other Comments: HX Esophageal CA.      DENTAL   DENTAL Positives: upper dentures, lower dentures    CARDIOVASCULAR - negative  CARDIOVASCULAR Positives: cardiovascular exam normal regular normal   Comments:    GI/HEPATIC/RENAL - negative  GI/HEPATIC/RENAL Positives: GERD,   Addition GI/Hepatic/Renal Comments: Squamous cell ca of esophagus    Anesthesia type: IV sedation with anesthesia  intravenous induction   Risks, benefits, and alternatives discussed with patient.  ASA 3

## 2017-02-17 ENCOUNTER — ANESTHESIA (OUTPATIENT)
Dept: GASTROENTEROLOGY | Facility: HOSPITAL | Age: 57
End: 2017-02-17

## 2017-02-17 ENCOUNTER — APPOINTMENT (INPATIENT)
Dept: RADIOLOGY | Facility: HOSPITAL | Age: 57
DRG: 871 | End: 2017-02-17
Payer: COMMERCIAL

## 2017-02-17 ENCOUNTER — APPOINTMENT (INPATIENT)
Dept: CT IMAGING | Facility: HOSPITAL | Age: 57
DRG: 871 | End: 2017-02-17
Payer: COMMERCIAL

## 2017-02-17 PROBLEM — F11.20 OPIOID DEPENDENCE (HCC): Status: ACTIVE | Noted: 2017-02-17

## 2017-02-17 PROBLEM — I63.9 ACUTE CVA (CEREBROVASCULAR ACCIDENT) (HCC): Status: ACTIVE | Noted: 2017-02-17

## 2017-02-17 LAB
ALBUMIN SERPL BCP-MCNC: 1.7 G/DL (ref 3.5–5)
ALP SERPL-CCNC: 148 U/L (ref 46–116)
ALT SERPL W P-5'-P-CCNC: 16 U/L (ref 12–78)
ANION GAP SERPL CALCULATED.3IONS-SCNC: 7 MMOL/L (ref 4–13)
AST SERPL W P-5'-P-CCNC: 34 U/L (ref 5–45)
BILIRUB SERPL-MCNC: 0.5 MG/DL (ref 0.2–1)
BUN SERPL-MCNC: 8 MG/DL (ref 5–25)
CALCIUM SERPL-MCNC: 7.7 MG/DL (ref 8.3–10.1)
CHLORIDE SERPL-SCNC: 105 MMOL/L (ref 100–108)
CO2 SERPL-SCNC: 27 MMOL/L (ref 21–32)
CREAT SERPL-MCNC: 0.57 MG/DL (ref 0.6–1.3)
ERYTHROCYTE [DISTWIDTH] IN BLOOD BY AUTOMATED COUNT: 17.4 % (ref 11.6–15.1)
GFR SERPL CREATININE-BSD FRML MDRD: >60 ML/MIN/1.73SQ M
GLUCOSE SERPL-MCNC: 86 MG/DL (ref 65–140)
HCT VFR BLD AUTO: 28.2 % (ref 36.5–49.3)
HGB BLD-MCNC: 8.9 G/DL (ref 12–17)
MCH RBC QN AUTO: 27.7 PG (ref 26.8–34.3)
MCHC RBC AUTO-ENTMCNC: 31.6 G/DL (ref 31.4–37.4)
MCV RBC AUTO: 88 FL (ref 82–98)
PLATELET # BLD AUTO: 126 THOUSANDS/UL (ref 149–390)
PMV BLD AUTO: 10 FL (ref 8.9–12.7)
POTASSIUM SERPL-SCNC: 4 MMOL/L (ref 3.5–5.3)
PROT SERPL-MCNC: 5.3 G/DL (ref 6.4–8.2)
RBC # BLD AUTO: 3.21 MILLION/UL (ref 3.88–5.62)
SODIUM SERPL-SCNC: 139 MMOL/L (ref 136–145)
WBC # BLD AUTO: 8.22 THOUSAND/UL (ref 4.31–10.16)

## 2017-02-17 PROCEDURE — 70498 CT ANGIOGRAPHY NECK: CPT

## 2017-02-17 PROCEDURE — 0D2DXUZ CHANGE FEEDING DEVICE IN LOWER INTESTINAL TRACT, EXTERNAL APPROACH: ICD-10-PCS | Performed by: RADIOLOGY

## 2017-02-17 PROCEDURE — 49451 REPLACE DUOD/JEJ TUBE PERC: CPT

## 2017-02-17 PROCEDURE — C1769 GUIDE WIRE: HCPCS

## 2017-02-17 PROCEDURE — A9585 GADOBUTROL INJECTION: HCPCS | Performed by: INTERNAL MEDICINE

## 2017-02-17 PROCEDURE — 80053 COMPREHEN METABOLIC PANEL: CPT | Performed by: INTERNAL MEDICINE

## 2017-02-17 PROCEDURE — 70496 CT ANGIOGRAPHY HEAD: CPT

## 2017-02-17 PROCEDURE — 85027 COMPLETE CBC AUTOMATED: CPT | Performed by: INTERNAL MEDICINE

## 2017-02-17 PROCEDURE — 92523 SPEECH SOUND LANG COMPREHEN: CPT

## 2017-02-17 RX ORDER — ASPIRIN 81 MG/1
81 TABLET, CHEWABLE ORAL DAILY
Status: DISCONTINUED | OUTPATIENT
Start: 2017-02-17 | End: 2017-02-23 | Stop reason: HOSPADM

## 2017-02-17 RX ORDER — ATORVASTATIN CALCIUM 40 MG/1
40 TABLET, FILM COATED ORAL
Status: DISCONTINUED | OUTPATIENT
Start: 2017-02-17 | End: 2017-02-23 | Stop reason: HOSPADM

## 2017-02-17 RX ADMIN — IOHEXOL 20 ML: 300 INJECTION, SOLUTION INTRAVENOUS at 15:55

## 2017-02-17 RX ADMIN — FAMOTIDINE 20 MG: 20 TABLET ORAL at 08:36

## 2017-02-17 RX ADMIN — OXYCODONE HYDROCHLORIDE 20 MG: 20 TABLET, FILM COATED, EXTENDED RELEASE ORAL at 08:36

## 2017-02-17 RX ADMIN — ATORVASTATIN CALCIUM 40 MG: 40 TABLET, FILM COATED ORAL at 18:24

## 2017-02-17 RX ADMIN — PIPERACILLIN SODIUM,TAZOBACTAM SODIUM 3.38 G: 3; .375 INJECTION, POWDER, FOR SOLUTION INTRAVENOUS at 13:10

## 2017-02-17 RX ADMIN — GADOBUTROL 8 ML: 604.72 INJECTION INTRAVENOUS at 01:31

## 2017-02-17 RX ADMIN — IOHEXOL 85 ML: 350 INJECTION, SOLUTION INTRAVENOUS at 12:45

## 2017-02-17 RX ADMIN — GABAPENTIN 100 MG: 100 CAPSULE ORAL at 08:36

## 2017-02-17 RX ADMIN — PANTOPRAZOLE SODIUM 40 MG: 40 TABLET, DELAYED RELEASE ORAL at 05:57

## 2017-02-17 RX ADMIN — OXYCODONE HYDROCHLORIDE 10 MG: 5 TABLET ORAL at 16:03

## 2017-02-17 RX ADMIN — PIPERACILLIN SODIUM,TAZOBACTAM SODIUM 3.38 G: 3; .375 INJECTION, POWDER, FOR SOLUTION INTRAVENOUS at 04:44

## 2017-02-17 RX ADMIN — ASPIRIN 81 MG 81 MG: 81 TABLET ORAL at 08:36

## 2017-02-17 RX ADMIN — HEPARIN SODIUM 5000 UNITS: 5000 INJECTION, SOLUTION INTRAVENOUS; SUBCUTANEOUS at 21:47

## 2017-02-17 RX ADMIN — OXYCODONE HYDROCHLORIDE 20 MG: 20 TABLET, FILM COATED, EXTENDED RELEASE ORAL at 21:47

## 2017-02-17 RX ADMIN — GABAPENTIN 50 MG: 250 SUSPENSION ORAL at 22:24

## 2017-02-17 RX ADMIN — SUCRALFATE 1000 MG: 1 SUSPENSION ORAL at 18:24

## 2017-02-17 RX ADMIN — OXYCODONE HYDROCHLORIDE 10 MG: 5 TABLET ORAL at 05:57

## 2017-02-17 RX ADMIN — FAMOTIDINE 20 MG: 20 TABLET ORAL at 18:24

## 2017-02-17 RX ADMIN — TAMSULOSIN HYDROCHLORIDE 0.4 MG: 0.4 CAPSULE ORAL at 18:24

## 2017-02-17 RX ADMIN — SENNOSIDES 17.2 MG: 8.6 TABLET, FILM COATED ORAL at 08:39

## 2017-02-17 RX ADMIN — MELATONIN TAB 3 MG 6 MG: 3 TAB at 21:46

## 2017-02-17 RX ADMIN — SENNOSIDES 17.2 MG: 8.6 TABLET, FILM COATED ORAL at 18:24

## 2017-02-18 LAB
ALBUMIN SERPL BCP-MCNC: 1.7 G/DL (ref 3.5–5)
ALP SERPL-CCNC: 163 U/L (ref 46–116)
ALT SERPL W P-5'-P-CCNC: 21 U/L (ref 12–78)
ANION GAP SERPL CALCULATED.3IONS-SCNC: 7 MMOL/L (ref 4–13)
AST SERPL W P-5'-P-CCNC: 38 U/L (ref 5–45)
BILIRUB SERPL-MCNC: 0.31 MG/DL (ref 0.2–1)
BUN SERPL-MCNC: 9 MG/DL (ref 5–25)
CALCIUM SERPL-MCNC: 7.4 MG/DL (ref 8.3–10.1)
CHLORIDE SERPL-SCNC: 103 MMOL/L (ref 100–108)
CHOLEST SERPL-MCNC: 100 MG/DL (ref 50–200)
CO2 SERPL-SCNC: 27 MMOL/L (ref 21–32)
CREAT SERPL-MCNC: 0.67 MG/DL (ref 0.6–1.3)
ERYTHROCYTE [DISTWIDTH] IN BLOOD BY AUTOMATED COUNT: 17.2 % (ref 11.6–15.1)
GFR SERPL CREATININE-BSD FRML MDRD: >60 ML/MIN/1.73SQ M
GLUCOSE SERPL-MCNC: 131 MG/DL (ref 65–140)
HCT VFR BLD AUTO: 28.2 % (ref 36.5–49.3)
HDLC SERPL-MCNC: 35 MG/DL (ref 40–60)
HGB BLD-MCNC: 9 G/DL (ref 12–17)
LDLC SERPL CALC-MCNC: 53 MG/DL (ref 0–100)
MCH RBC QN AUTO: 28 PG (ref 26.8–34.3)
MCHC RBC AUTO-ENTMCNC: 31.9 G/DL (ref 31.4–37.4)
MCV RBC AUTO: 88 FL (ref 82–98)
PLATELET # BLD AUTO: 115 THOUSANDS/UL (ref 149–390)
PMV BLD AUTO: 9.2 FL (ref 8.9–12.7)
POTASSIUM SERPL-SCNC: 4 MMOL/L (ref 3.5–5.3)
PROT SERPL-MCNC: 5.3 G/DL (ref 6.4–8.2)
RBC # BLD AUTO: 3.22 MILLION/UL (ref 3.88–5.62)
SODIUM SERPL-SCNC: 137 MMOL/L (ref 136–145)
TRIGL SERPL-MCNC: 59 MG/DL
WBC # BLD AUTO: 6.68 THOUSAND/UL (ref 4.31–10.16)

## 2017-02-18 PROCEDURE — G8979 MOBILITY GOAL STATUS: HCPCS

## 2017-02-18 PROCEDURE — 80061 LIPID PANEL: CPT | Performed by: INTERNAL MEDICINE

## 2017-02-18 PROCEDURE — G8978 MOBILITY CURRENT STATUS: HCPCS

## 2017-02-18 PROCEDURE — 80053 COMPREHEN METABOLIC PANEL: CPT | Performed by: INTERNAL MEDICINE

## 2017-02-18 PROCEDURE — 97163 PT EVAL HIGH COMPLEX 45 MIN: CPT

## 2017-02-18 PROCEDURE — 97530 THERAPEUTIC ACTIVITIES: CPT

## 2017-02-18 PROCEDURE — 85027 COMPLETE CBC AUTOMATED: CPT | Performed by: INTERNAL MEDICINE

## 2017-02-18 RX ORDER — MORPHINE SULFATE 2 MG/ML
2 INJECTION, SOLUTION INTRAMUSCULAR; INTRAVENOUS EVERY 4 HOURS PRN
Status: DISCONTINUED | OUTPATIENT
Start: 2017-02-18 | End: 2017-02-23 | Stop reason: HOSPADM

## 2017-02-18 RX ADMIN — HEPARIN SODIUM 5000 UNITS: 5000 INJECTION, SOLUTION INTRAVENOUS; SUBCUTANEOUS at 13:44

## 2017-02-18 RX ADMIN — HEPARIN SODIUM 5000 UNITS: 5000 INJECTION, SOLUTION INTRAVENOUS; SUBCUTANEOUS at 21:36

## 2017-02-18 RX ADMIN — OXYCODONE HYDROCHLORIDE 20 MG: 20 TABLET, FILM COATED, EXTENDED RELEASE ORAL at 21:36

## 2017-02-18 RX ADMIN — MELATONIN TAB 3 MG 6 MG: 3 TAB at 21:36

## 2017-02-18 RX ADMIN — OXYCODONE HYDROCHLORIDE 20 MG: 20 TABLET, FILM COATED, EXTENDED RELEASE ORAL at 08:25

## 2017-02-18 RX ADMIN — SENNOSIDES 17.2 MG: 8.6 TABLET, FILM COATED ORAL at 08:28

## 2017-02-18 RX ADMIN — OXYCODONE HYDROCHLORIDE 10 MG: 5 TABLET ORAL at 21:36

## 2017-02-18 RX ADMIN — PANTOPRAZOLE SODIUM 40 MG: 40 TABLET, DELAYED RELEASE ORAL at 05:57

## 2017-02-18 RX ADMIN — FAMOTIDINE 20 MG: 20 TABLET ORAL at 08:27

## 2017-02-18 RX ADMIN — GABAPENTIN 100 MG: 100 CAPSULE ORAL at 08:28

## 2017-02-18 RX ADMIN — OXYCODONE HYDROCHLORIDE 10 MG: 5 TABLET ORAL at 13:44

## 2017-02-18 RX ADMIN — SUCRALFATE 1000 MG: 1 SUSPENSION ORAL at 17:31

## 2017-02-18 RX ADMIN — HEPARIN SODIUM 5000 UNITS: 5000 INJECTION, SOLUTION INTRAVENOUS; SUBCUTANEOUS at 05:57

## 2017-02-18 RX ADMIN — MORPHINE SULFATE 2 MG: 2 INJECTION, SOLUTION INTRAMUSCULAR; INTRAVENOUS at 17:31

## 2017-02-18 RX ADMIN — TAMSULOSIN HYDROCHLORIDE 0.4 MG: 0.4 CAPSULE ORAL at 17:31

## 2017-02-18 RX ADMIN — GABAPENTIN 50 MG: 250 SUSPENSION ORAL at 21:36

## 2017-02-18 RX ADMIN — ASPIRIN 81 MG 81 MG: 81 TABLET ORAL at 08:27

## 2017-02-18 RX ADMIN — SENNOSIDES 17.2 MG: 8.6 TABLET, FILM COATED ORAL at 17:31

## 2017-02-18 RX ADMIN — SUCRALFATE 1000 MG: 1 SUSPENSION ORAL at 06:00

## 2017-02-18 RX ADMIN — ATORVASTATIN CALCIUM 40 MG: 40 TABLET, FILM COATED ORAL at 17:31

## 2017-02-18 RX ADMIN — FAMOTIDINE 20 MG: 20 TABLET ORAL at 17:31

## 2017-02-19 PROCEDURE — 97116 GAIT TRAINING THERAPY: CPT

## 2017-02-19 PROCEDURE — 97110 THERAPEUTIC EXERCISES: CPT

## 2017-02-19 RX ADMIN — MELATONIN TAB 3 MG 6 MG: 3 TAB at 21:17

## 2017-02-19 RX ADMIN — SENNOSIDES 17.2 MG: 8.6 TABLET, FILM COATED ORAL at 08:55

## 2017-02-19 RX ADMIN — MORPHINE SULFATE 2 MG: 2 INJECTION, SOLUTION INTRAMUSCULAR; INTRAVENOUS at 21:17

## 2017-02-19 RX ADMIN — OXYCODONE HYDROCHLORIDE 10 MG: 5 TABLET ORAL at 13:25

## 2017-02-19 RX ADMIN — MORPHINE SULFATE 2 MG: 2 INJECTION, SOLUTION INTRAMUSCULAR; INTRAVENOUS at 17:13

## 2017-02-19 RX ADMIN — FAMOTIDINE 20 MG: 20 TABLET ORAL at 17:10

## 2017-02-19 RX ADMIN — FAMOTIDINE 20 MG: 20 TABLET ORAL at 08:55

## 2017-02-19 RX ADMIN — SUCRALFATE 1000 MG: 1 SUSPENSION ORAL at 06:05

## 2017-02-19 RX ADMIN — HEPARIN SODIUM 5000 UNITS: 5000 INJECTION, SOLUTION INTRAVENOUS; SUBCUTANEOUS at 06:05

## 2017-02-19 RX ADMIN — SENNOSIDES 17.2 MG: 8.6 TABLET, FILM COATED ORAL at 17:10

## 2017-02-19 RX ADMIN — MORPHINE SULFATE 2 MG: 2 INJECTION, SOLUTION INTRAMUSCULAR; INTRAVENOUS at 06:05

## 2017-02-19 RX ADMIN — PANTOPRAZOLE SODIUM 40 MG: 40 TABLET, DELAYED RELEASE ORAL at 06:05

## 2017-02-19 RX ADMIN — OXYCODONE HYDROCHLORIDE 20 MG: 20 TABLET, FILM COATED, EXTENDED RELEASE ORAL at 21:17

## 2017-02-19 RX ADMIN — HEPARIN SODIUM 5000 UNITS: 5000 INJECTION, SOLUTION INTRAVENOUS; SUBCUTANEOUS at 13:25

## 2017-02-19 RX ADMIN — ASPIRIN 81 MG 81 MG: 81 TABLET ORAL at 08:55

## 2017-02-19 RX ADMIN — SUCRALFATE 1000 MG: 1 SUSPENSION ORAL at 17:10

## 2017-02-19 RX ADMIN — OXYCODONE HYDROCHLORIDE 20 MG: 20 TABLET, FILM COATED, EXTENDED RELEASE ORAL at 08:55

## 2017-02-19 RX ADMIN — GABAPENTIN 50 MG: 250 SUSPENSION ORAL at 21:17

## 2017-02-19 RX ADMIN — TAMSULOSIN HYDROCHLORIDE 0.4 MG: 0.4 CAPSULE ORAL at 17:10

## 2017-02-19 RX ADMIN — GABAPENTIN 100 MG: 100 CAPSULE ORAL at 08:55

## 2017-02-19 RX ADMIN — ATORVASTATIN CALCIUM 40 MG: 40 TABLET, FILM COATED ORAL at 17:10

## 2017-02-19 RX ADMIN — HEPARIN SODIUM 5000 UNITS: 5000 INJECTION, SOLUTION INTRAVENOUS; SUBCUTANEOUS at 21:17

## 2017-02-20 ENCOUNTER — HOSPITAL ENCOUNTER (OUTPATIENT)
Dept: INFUSION CENTER | Facility: CLINIC | Age: 57
Discharge: HOME | End: 2017-02-20
Payer: COMMERCIAL

## 2017-02-20 ENCOUNTER — APPOINTMENT (INPATIENT)
Dept: RADIOLOGY | Facility: HOSPITAL | Age: 57
DRG: 871 | End: 2017-02-20
Payer: COMMERCIAL

## 2017-02-20 LAB
BACTERIA BLD CULT: NORMAL
BACTERIA BLD CULT: NORMAL

## 2017-02-20 PROCEDURE — 97116 GAIT TRAINING THERAPY: CPT

## 2017-02-20 PROCEDURE — 97110 THERAPEUTIC EXERCISES: CPT

## 2017-02-20 PROCEDURE — 97530 THERAPEUTIC ACTIVITIES: CPT

## 2017-02-20 PROCEDURE — 71020 HB CHEST X-RAY 2VW FRONTAL&LATL: CPT

## 2017-02-20 RX ADMIN — ASPIRIN 81 MG 81 MG: 81 TABLET ORAL at 08:20

## 2017-02-20 RX ADMIN — OXYCODONE HYDROCHLORIDE 20 MG: 20 TABLET, FILM COATED, EXTENDED RELEASE ORAL at 08:20

## 2017-02-20 RX ADMIN — SUCRALFATE 1000 MG: 1 SUSPENSION ORAL at 06:12

## 2017-02-20 RX ADMIN — SENNOSIDES 17.2 MG: 8.6 TABLET, FILM COATED ORAL at 08:21

## 2017-02-20 RX ADMIN — ACETAMINOPHEN 650 MG: 325 TABLET, FILM COATED ORAL at 00:52

## 2017-02-20 RX ADMIN — GABAPENTIN 100 MG: 100 CAPSULE ORAL at 08:20

## 2017-02-20 RX ADMIN — SUCRALFATE 1000 MG: 1 SUSPENSION ORAL at 17:07

## 2017-02-20 RX ADMIN — MORPHINE SULFATE 2 MG: 2 INJECTION, SOLUTION INTRAMUSCULAR; INTRAVENOUS at 14:25

## 2017-02-20 RX ADMIN — FAMOTIDINE 20 MG: 20 TABLET ORAL at 08:20

## 2017-02-20 RX ADMIN — MELATONIN TAB 3 MG 6 MG: 3 TAB at 22:49

## 2017-02-20 RX ADMIN — GABAPENTIN 50 MG: 250 SUSPENSION ORAL at 22:50

## 2017-02-20 RX ADMIN — HEPARIN SODIUM 5000 UNITS: 5000 INJECTION, SOLUTION INTRAVENOUS; SUBCUTANEOUS at 22:50

## 2017-02-20 RX ADMIN — OXYCODONE HYDROCHLORIDE 10 MG: 5 TABLET ORAL at 18:49

## 2017-02-20 RX ADMIN — HEPARIN SODIUM 5000 UNITS: 5000 INJECTION, SOLUTION INTRAVENOUS; SUBCUTANEOUS at 06:12

## 2017-02-20 RX ADMIN — ATORVASTATIN CALCIUM 40 MG: 40 TABLET, FILM COATED ORAL at 17:07

## 2017-02-20 RX ADMIN — OXYCODONE HYDROCHLORIDE 20 MG: 20 TABLET, FILM COATED, EXTENDED RELEASE ORAL at 21:04

## 2017-02-20 RX ADMIN — TAMSULOSIN HYDROCHLORIDE 0.4 MG: 0.4 CAPSULE ORAL at 17:07

## 2017-02-20 RX ADMIN — OXYCODONE HYDROCHLORIDE 10 MG: 5 TABLET ORAL at 00:52

## 2017-02-20 RX ADMIN — HEPARIN SODIUM 5000 UNITS: 5000 INJECTION, SOLUTION INTRAVENOUS; SUBCUTANEOUS at 15:18

## 2017-02-20 RX ADMIN — FAMOTIDINE 20 MG: 20 TABLET ORAL at 17:07

## 2017-02-20 RX ADMIN — MORPHINE SULFATE 2 MG: 2 INJECTION, SOLUTION INTRAMUSCULAR; INTRAVENOUS at 06:13

## 2017-02-20 RX ADMIN — PANTOPRAZOLE SODIUM 40 MG: 40 TABLET, DELAYED RELEASE ORAL at 06:12

## 2017-02-21 PROBLEM — R93.89 ABNORMAL CHEST CT: Status: ACTIVE | Noted: 2017-02-15

## 2017-02-21 LAB
ALBUMIN SERPL BCP-MCNC: 2 G/DL (ref 3.5–5)
ALP SERPL-CCNC: 206 U/L (ref 46–116)
ALT SERPL W P-5'-P-CCNC: 27 U/L (ref 12–78)
ANION GAP SERPL CALCULATED.3IONS-SCNC: 6 MMOL/L (ref 4–13)
AST SERPL W P-5'-P-CCNC: 41 U/L (ref 5–45)
BILIRUB SERPL-MCNC: 0.28 MG/DL (ref 0.2–1)
BUN SERPL-MCNC: 8 MG/DL (ref 5–25)
CALCIUM SERPL-MCNC: 8.1 MG/DL (ref 8.3–10.1)
CHLORIDE SERPL-SCNC: 103 MMOL/L (ref 100–108)
CO2 SERPL-SCNC: 29 MMOL/L (ref 21–32)
CREAT SERPL-MCNC: 0.6 MG/DL (ref 0.6–1.3)
ERYTHROCYTE [DISTWIDTH] IN BLOOD BY AUTOMATED COUNT: 16.8 % (ref 11.6–15.1)
GFR SERPL CREATININE-BSD FRML MDRD: >60 ML/MIN/1.73SQ M
GLUCOSE SERPL-MCNC: 117 MG/DL (ref 65–140)
HCT VFR BLD AUTO: 31.4 % (ref 36.5–49.3)
HGB BLD-MCNC: 9.4 G/DL (ref 12–17)
MCH RBC QN AUTO: 26.8 PG (ref 26.8–34.3)
MCHC RBC AUTO-ENTMCNC: 29.9 G/DL (ref 31.4–37.4)
MCV RBC AUTO: 90 FL (ref 82–98)
PLATELET # BLD AUTO: 194 THOUSANDS/UL (ref 149–390)
PMV BLD AUTO: 9.6 FL (ref 8.9–12.7)
POTASSIUM SERPL-SCNC: 4.4 MMOL/L (ref 3.5–5.3)
PROT SERPL-MCNC: 6 G/DL (ref 6.4–8.2)
RBC # BLD AUTO: 3.51 MILLION/UL (ref 3.88–5.62)
SODIUM SERPL-SCNC: 138 MMOL/L (ref 136–145)
WBC # BLD AUTO: 5.72 THOUSAND/UL (ref 4.31–10.16)

## 2017-02-21 PROCEDURE — 97110 THERAPEUTIC EXERCISES: CPT

## 2017-02-21 PROCEDURE — G8988 SELF CARE GOAL STATUS: HCPCS

## 2017-02-21 PROCEDURE — 92507 TX SP LANG VOICE COMM INDIV: CPT

## 2017-02-21 PROCEDURE — 85027 COMPLETE CBC AUTOMATED: CPT | Performed by: INTERNAL MEDICINE

## 2017-02-21 PROCEDURE — 97530 THERAPEUTIC ACTIVITIES: CPT

## 2017-02-21 PROCEDURE — 97167 OT EVAL HIGH COMPLEX 60 MIN: CPT

## 2017-02-21 PROCEDURE — 80053 COMPREHEN METABOLIC PANEL: CPT | Performed by: INTERNAL MEDICINE

## 2017-02-21 PROCEDURE — G8987 SELF CARE CURRENT STATUS: HCPCS

## 2017-02-21 PROCEDURE — 97116 GAIT TRAINING THERAPY: CPT

## 2017-02-21 RX ORDER — OXYCODONE HCL 20 MG/1
TABLET, FILM COATED, EXTENDED RELEASE ORAL
Status: COMPLETED
Start: 2017-02-21 | End: 2017-02-21

## 2017-02-21 RX ORDER — HEPARIN SODIUM 5000 [USP'U]/ML
INJECTION, SOLUTION INTRAVENOUS; SUBCUTANEOUS
Status: COMPLETED
Start: 2017-02-21 | End: 2017-02-21

## 2017-02-21 RX ADMIN — OXYCODONE HYDROCHLORIDE 10 MG: 5 TABLET ORAL at 11:22

## 2017-02-21 RX ADMIN — ASPIRIN 81 MG 81 MG: 81 TABLET ORAL at 08:06

## 2017-02-21 RX ADMIN — HEPARIN SODIUM 5000 UNITS: 5000 INJECTION, SOLUTION INTRAVENOUS; SUBCUTANEOUS at 13:17

## 2017-02-21 RX ADMIN — SUCRALFATE 1000 MG: 1 SUSPENSION ORAL at 16:56

## 2017-02-21 RX ADMIN — PANTOPRAZOLE SODIUM 40 MG: 40 TABLET, DELAYED RELEASE ORAL at 05:46

## 2017-02-21 RX ADMIN — FAMOTIDINE 20 MG: 20 TABLET ORAL at 08:06

## 2017-02-21 RX ADMIN — TAMSULOSIN HYDROCHLORIDE 0.4 MG: 0.4 CAPSULE ORAL at 16:56

## 2017-02-21 RX ADMIN — FAMOTIDINE 20 MG: 20 TABLET ORAL at 17:00

## 2017-02-21 RX ADMIN — ATORVASTATIN CALCIUM 40 MG: 40 TABLET, FILM COATED ORAL at 16:56

## 2017-02-21 RX ADMIN — GABAPENTIN 100 MG: 100 CAPSULE ORAL at 08:06

## 2017-02-21 RX ADMIN — OXYCODONE HYDROCHLORIDE 20 MG: 20 TABLET, FILM COATED, EXTENDED RELEASE ORAL at 21:30

## 2017-02-21 RX ADMIN — SENNOSIDES 17.2 MG: 8.6 TABLET, FILM COATED ORAL at 08:05

## 2017-02-21 RX ADMIN — MELATONIN TAB 3 MG 6 MG: 3 TAB at 22:00

## 2017-02-21 RX ADMIN — HEPARIN SODIUM 5000 UNITS: 5000 INJECTION, SOLUTION INTRAVENOUS; SUBCUTANEOUS at 21:00

## 2017-02-21 RX ADMIN — SUCRALFATE 1000 MG: 1 SUSPENSION ORAL at 08:07

## 2017-02-21 RX ADMIN — OXYCODONE HYDROCHLORIDE 10 MG: 5 TABLET ORAL at 16:06

## 2017-02-21 RX ADMIN — GABAPENTIN 50 MG: 250 SUSPENSION ORAL at 22:00

## 2017-02-21 RX ADMIN — SENNOSIDES 17.2 MG: 8.6 TABLET, FILM COATED ORAL at 17:00

## 2017-02-21 RX ADMIN — HEPARIN SODIUM 5000 UNITS: 5000 INJECTION, SOLUTION INTRAVENOUS; SUBCUTANEOUS at 05:49

## 2017-02-21 RX ADMIN — OXYCODONE HYDROCHLORIDE 20 MG: 20 TABLET, FILM COATED, EXTENDED RELEASE ORAL at 08:06

## 2017-02-22 ENCOUNTER — GENERIC CONVERSION - ENCOUNTER (OUTPATIENT)
Dept: OTHER | Facility: OTHER | Age: 57
End: 2017-02-22

## 2017-02-22 ENCOUNTER — APPOINTMENT (INPATIENT)
Dept: RADIOLOGY | Facility: HOSPITAL | Age: 57
DRG: 871 | End: 2017-02-22
Payer: COMMERCIAL

## 2017-02-22 PROBLEM — R33.9 URINARY RETENTION: Status: RESOLVED | Noted: 2017-02-16 | Resolved: 2017-02-22

## 2017-02-22 PROCEDURE — 97530 THERAPEUTIC ACTIVITIES: CPT

## 2017-02-22 PROCEDURE — 74230 X-RAY XM SWLNG FUNCJ C+: CPT

## 2017-02-22 PROCEDURE — 97110 THERAPEUTIC EXERCISES: CPT

## 2017-02-22 PROCEDURE — 92611 MOTION FLUOROSCOPY/SWALLOW: CPT

## 2017-02-22 PROCEDURE — 97116 GAIT TRAINING THERAPY: CPT

## 2017-02-22 RX ADMIN — HEPARIN SODIUM 5000 UNITS: 5000 INJECTION, SOLUTION INTRAVENOUS; SUBCUTANEOUS at 13:20

## 2017-02-22 RX ADMIN — FAMOTIDINE 20 MG: 20 TABLET ORAL at 08:03

## 2017-02-22 RX ADMIN — SENNOSIDES 17.2 MG: 8.6 TABLET, FILM COATED ORAL at 08:03

## 2017-02-22 RX ADMIN — OXYCODONE HYDROCHLORIDE 10 MG: 5 TABLET ORAL at 13:20

## 2017-02-22 RX ADMIN — ASPIRIN 81 MG 81 MG: 81 TABLET ORAL at 08:03

## 2017-02-22 RX ADMIN — SUCRALFATE 1000 MG: 1 SUSPENSION ORAL at 08:03

## 2017-02-22 RX ADMIN — OXYCODONE HYDROCHLORIDE 10 MG: 5 TABLET ORAL at 22:22

## 2017-02-22 RX ADMIN — ATORVASTATIN CALCIUM 40 MG: 40 TABLET, FILM COATED ORAL at 17:01

## 2017-02-22 RX ADMIN — GABAPENTIN 50 MG: 250 SUSPENSION ORAL at 22:26

## 2017-02-22 RX ADMIN — HEPARIN SODIUM 5000 UNITS: 5000 INJECTION, SOLUTION INTRAVENOUS; SUBCUTANEOUS at 05:23

## 2017-02-22 RX ADMIN — SUCRALFATE 1000 MG: 1 SUSPENSION ORAL at 17:02

## 2017-02-22 RX ADMIN — PANTOPRAZOLE SODIUM 40 MG: 40 TABLET, DELAYED RELEASE ORAL at 05:23

## 2017-02-22 RX ADMIN — FAMOTIDINE 20 MG: 20 TABLET ORAL at 17:01

## 2017-02-22 RX ADMIN — OXYCODONE HYDROCHLORIDE 20 MG: 20 TABLET, FILM COATED, EXTENDED RELEASE ORAL at 20:15

## 2017-02-22 RX ADMIN — SENNOSIDES 17.2 MG: 8.6 TABLET, FILM COATED ORAL at 17:01

## 2017-02-22 RX ADMIN — OXYCODONE HYDROCHLORIDE 20 MG: 20 TABLET, FILM COATED, EXTENDED RELEASE ORAL at 08:03

## 2017-02-22 RX ADMIN — GABAPENTIN 100 MG: 100 CAPSULE ORAL at 08:03

## 2017-02-22 RX ADMIN — TAMSULOSIN HYDROCHLORIDE 0.4 MG: 0.4 CAPSULE ORAL at 17:02

## 2017-02-22 RX ADMIN — MELATONIN TAB 3 MG 6 MG: 3 TAB at 22:23

## 2017-02-22 RX ADMIN — OXYCODONE HYDROCHLORIDE 10 MG: 5 TABLET ORAL at 17:04

## 2017-02-22 RX ADMIN — HEPARIN SODIUM 5000 UNITS: 5000 INJECTION, SOLUTION INTRAVENOUS; SUBCUTANEOUS at 22:23

## 2017-02-23 ENCOUNTER — APPOINTMENT (INPATIENT)
Dept: RADIOLOGY | Facility: HOSPITAL | Age: 57
DRG: 871 | End: 2017-02-23
Payer: COMMERCIAL

## 2017-02-23 VITALS
HEART RATE: 107 BPM | TEMPERATURE: 98.2 F | SYSTOLIC BLOOD PRESSURE: 120 MMHG | OXYGEN SATURATION: 85 % | RESPIRATION RATE: 18 BRPM | DIASTOLIC BLOOD PRESSURE: 60 MMHG | BODY MASS INDEX: 28.15 KG/M2 | WEIGHT: 190.04 LBS | HEIGHT: 69 IN

## 2017-02-23 PROCEDURE — 97116 GAIT TRAINING THERAPY: CPT

## 2017-02-23 PROCEDURE — 94620 HB PULMONARY STRESS TEST/SIMPLE: CPT

## 2017-02-23 PROCEDURE — 97530 THERAPEUTIC ACTIVITIES: CPT

## 2017-02-23 PROCEDURE — 97110 THERAPEUTIC EXERCISES: CPT

## 2017-02-23 PROCEDURE — 97535 SELF CARE MNGMENT TRAINING: CPT

## 2017-02-23 PROCEDURE — 71020 HB CHEST X-RAY 2VW FRONTAL&LATL: CPT

## 2017-02-23 RX ORDER — ASPIRIN 81 MG/1
81 TABLET, CHEWABLE ORAL DAILY
Qty: 30 TABLET | Refills: 0 | Status: SHIPPED | OUTPATIENT
Start: 2017-02-23 | End: 2017-03-25

## 2017-02-23 RX ORDER — TAMSULOSIN HYDROCHLORIDE 0.4 MG/1
0.4 CAPSULE ORAL
Qty: 30 CAPSULE | Refills: 0 | Status: SHIPPED | OUTPATIENT
Start: 2017-02-23 | End: 2017-03-25

## 2017-02-23 RX ORDER — OMEPRAZOLE 20 MG/1
20 CAPSULE, DELAYED RELEASE ORAL 2 TIMES DAILY
Qty: 60 CAPSULE | Refills: 0 | Status: SHIPPED | OUTPATIENT
Start: 2017-02-23 | End: 2017-03-25

## 2017-02-23 RX ORDER — ATORVASTATIN CALCIUM 40 MG/1
40 TABLET, FILM COATED ORAL
Qty: 30 TABLET | Refills: 0 | Status: SHIPPED | OUTPATIENT
Start: 2017-02-23 | End: 2017-03-14

## 2017-02-23 RX ADMIN — SENNOSIDES 17.2 MG: 8.6 TABLET, FILM COATED ORAL at 08:29

## 2017-02-23 RX ADMIN — SUCRALFATE 1000 MG: 1 SUSPENSION ORAL at 06:14

## 2017-02-23 RX ADMIN — OXYCODONE HYDROCHLORIDE 20 MG: 20 TABLET, FILM COATED, EXTENDED RELEASE ORAL at 20:07

## 2017-02-23 RX ADMIN — PANTOPRAZOLE SODIUM 40 MG: 40 TABLET, DELAYED RELEASE ORAL at 06:14

## 2017-02-23 RX ADMIN — OXYCODONE HYDROCHLORIDE 10 MG: 5 TABLET ORAL at 06:14

## 2017-02-23 RX ADMIN — ASPIRIN 81 MG 81 MG: 81 TABLET ORAL at 08:29

## 2017-02-23 RX ADMIN — GABAPENTIN 100 MG: 100 CAPSULE ORAL at 08:29

## 2017-02-23 RX ADMIN — OXYCODONE HYDROCHLORIDE 10 MG: 5 TABLET ORAL at 12:59

## 2017-02-23 RX ADMIN — FAMOTIDINE 20 MG: 20 TABLET ORAL at 17:01

## 2017-02-23 RX ADMIN — SENNOSIDES 17.2 MG: 8.6 TABLET, FILM COATED ORAL at 17:01

## 2017-02-23 RX ADMIN — OXYCODONE HYDROCHLORIDE 20 MG: 20 TABLET, FILM COATED, EXTENDED RELEASE ORAL at 08:29

## 2017-02-23 RX ADMIN — OXYCODONE HYDROCHLORIDE 10 MG: 5 TABLET ORAL at 17:04

## 2017-02-23 RX ADMIN — ATORVASTATIN CALCIUM 40 MG: 40 TABLET, FILM COATED ORAL at 16:35

## 2017-02-23 RX ADMIN — HEPARIN SODIUM 5000 UNITS: 5000 INJECTION, SOLUTION INTRAVENOUS; SUBCUTANEOUS at 08:29

## 2017-02-23 RX ADMIN — SUCRALFATE 1000 MG: 1 SUSPENSION ORAL at 16:35

## 2017-02-23 RX ADMIN — FAMOTIDINE 20 MG: 20 TABLET ORAL at 08:29

## 2017-02-23 RX ADMIN — TAMSULOSIN HYDROCHLORIDE 0.4 MG: 0.4 CAPSULE ORAL at 16:35

## 2017-02-23 RX ADMIN — HEPARIN SODIUM 5000 UNITS: 5000 INJECTION, SOLUTION INTRAVENOUS; SUBCUTANEOUS at 14:49

## 2017-02-24 ENCOUNTER — GENERIC CONVERSION - ENCOUNTER (OUTPATIENT)
Dept: OTHER | Facility: OTHER | Age: 57
End: 2017-02-24

## 2017-03-06 ENCOUNTER — GENERIC CONVERSION - ENCOUNTER (OUTPATIENT)
Dept: OTHER | Facility: OTHER | Age: 57
End: 2017-03-06

## 2017-03-14 ENCOUNTER — APPOINTMENT (EMERGENCY)
Dept: RADIOLOGY | Facility: HOSPITAL | Age: 57
End: 2017-03-14
Payer: COMMERCIAL

## 2017-03-14 ENCOUNTER — HOSPITAL ENCOUNTER (EMERGENCY)
Facility: HOSPITAL | Age: 57
Discharge: HOSPICE | End: 2017-03-15
Attending: EMERGENCY MEDICINE | Admitting: EMERGENCY MEDICINE
Payer: COMMERCIAL

## 2017-03-14 VITALS
TEMPERATURE: 98.1 F | SYSTOLIC BLOOD PRESSURE: 139 MMHG | OXYGEN SATURATION: 97 % | WEIGHT: 188.49 LBS | RESPIRATION RATE: 20 BRPM | BODY MASS INDEX: 27.44 KG/M2 | DIASTOLIC BLOOD PRESSURE: 79 MMHG | HEART RATE: 101 BPM

## 2017-03-14 DIAGNOSIS — Z51.5 HOSPICE CARE: ICD-10-CM

## 2017-03-14 DIAGNOSIS — J18.9 PNEUMONIA: ICD-10-CM

## 2017-03-14 DIAGNOSIS — C79.9 METASTASIS (HCC): ICD-10-CM

## 2017-03-14 DIAGNOSIS — R45.1 AGITATION: ICD-10-CM

## 2017-03-14 DIAGNOSIS — R41.0 CONFUSION: ICD-10-CM

## 2017-03-14 DIAGNOSIS — C15.9 ESOPHAGEAL CANCER (HCC): Primary | ICD-10-CM

## 2017-03-14 LAB
BILIRUB UR QL STRIP: NEGATIVE
CLARITY UR: CLEAR
CLARITY, POC: CLEAR
COLOR UR: YELLOW
COLOR, POC: YELLOW
GLUCOSE UR STRIP-MCNC: NEGATIVE MG/DL
HGB UR QL STRIP.AUTO: NEGATIVE
KETONES UR STRIP-MCNC: NEGATIVE MG/DL
LEUKOCYTE ESTERASE UR QL STRIP: NEGATIVE
NITRITE UR QL STRIP: NEGATIVE
PH UR STRIP.AUTO: 7 [PH] (ref 4.5–8)
PROT UR STRIP-MCNC: NEGATIVE MG/DL
SP GR UR STRIP.AUTO: 1.01 (ref 1–1.03)
UROBILINOGEN UR QL STRIP.AUTO: 0.2 E.U./DL

## 2017-03-14 PROCEDURE — 81003 URINALYSIS AUTO W/O SCOPE: CPT

## 2017-03-14 PROCEDURE — 81002 URINALYSIS NONAUTO W/O SCOPE: CPT | Performed by: EMERGENCY MEDICINE

## 2017-03-14 PROCEDURE — 71020 HB CHEST X-RAY 2VW FRONTAL&LATL: CPT

## 2017-03-14 RX ORDER — PHENOL 1.4 %
1 AEROSOL, SPRAY (ML) MUCOUS MEMBRANE
COMMUNITY

## 2017-03-14 RX ORDER — OXYCODONE HCL 20 MG/1
20 TABLET, FILM COATED, EXTENDED RELEASE ORAL EVERY 4 HOURS PRN
COMMUNITY

## 2017-03-14 RX ORDER — GABAPENTIN 300 MG/1
600 CAPSULE ORAL
COMMUNITY

## 2017-03-14 RX ORDER — ATORVASTATIN CALCIUM 20 MG/1
20 TABLET, FILM COATED ORAL DAILY
COMMUNITY

## 2017-03-14 RX ORDER — OXYCODONE HCL 20 MG/1
40 TABLET, FILM COATED, EXTENDED RELEASE ORAL EVERY MORNING
COMMUNITY

## 2017-03-14 RX ORDER — LACTULOSE 10 G/10G
30 SOLUTION ORAL 2 TIMES DAILY
COMMUNITY

## 2017-03-15 PROCEDURE — 51798 US URINE CAPACITY MEASURE: CPT

## 2017-03-15 PROCEDURE — 99285 EMERGENCY DEPT VISIT HI MDM: CPT

## 2018-01-10 NOTE — MISCELLANEOUS
Chief Complaint  Chief Complaint Free Text Note Form: pt actually cancelled appointment after oncology visit      History of Present Illness  TCM Communication [facility]: The patient is being contacted for follow-up after hospitalization  He was hospitalized at [facility]  The date of discharge: 4/10/2016  He was discharged to home  He scheduled a follow up appointment  The patient is currently asymptomatic  Communication performed and completed by jen      Active Problems    1  Abnormal liver enzymes (790 5) (R74 8)  2  Acid reflux (530 81) (K21 9)  3  Anxiety (300 00) (F41 9)  4  Insomnia due to medical condition (327 01) (G47 01)  5  Nausea (787 02) (R11 0)  6  Vomiting (787 03) (R11 10)    Esophageal cancer (150 9) (C15 9)     Past Medical History   1  History of No known problems    Surgical History   1  History of Appendectomy Incidental    Family History   1  Family history of Coronary artery disease with angina pectoris, unspecified vessel or   lesion type, unspecified whether native or transplanted heart  2  Family history of hypertension (V17 49) (Z82 49)   3  Family history of non-Hodgkin's lymphoma (V16 7) (Z80 7)   4  Family history of Asthma with status asthmaticus, unspecified asthma severity    Social History    · Denied: History of Alcohol use   · Denied: Drug use (305 90) (F19 90)   · Former smoker (K22 03) (P6 998)   · Full-time employment   ·    · One child    Current Meds  1  ALPRAZolam 0 5 MG Oral Tablet Dispersible; TAKE 1 TABLET 3 TIMES DAILY AS   NEEDED; Therapy: 01Apr2016 to (Evaluate:81Zdd5724); Last Rx:01Apr2016 Ordered  2  DiphenhydrAMINE HCl - 12 5 MG/5ML Oral Liquid; 2-3 teaspoons qhs and 1 teaspoon   prn; Therapy: 01Apr2016 to (Evaluate:30Jun2016)  Requested for: 01Apr2016; Last   Rx:01Apr2016 Ordered    Allergies   1  Tagamet TABS    Future Appointments    Date/Time Provider Specialty Site   04/22/2016 10:30 AM Gilford Felix, M D   Thoracic Surgery CT SURGERY [place]   05/02/2016 08:00 AM Leon Dobbs MD Gastroenterology [facility]   05/16/2016 08:40 AM KEELY Eastman  Hematology Oncology [facility]     Signatures   Electronically signed by : Gomez Kumar MD; Apr 15 2016 12:26PM EST                       (Author)    Electronically signed by :  Gomez Kumar MD; Apr 15 2016 12:26PM EST                       (Author)

## 2018-01-10 NOTE — MISCELLANEOUS
Provider Comments  Provider Comments:   Dear Danika Ashton,    You have missed your appointment with Dr Molly Jauregui on 11/21/2016  Please call our office at [phone] to reschedule at your convenience      Thank you,  [facility]      Signatures   Electronically signed by : Tatianna Rodriguez MD; Nov 21 2016 10:11AM EST                       (Author)    Electronically signed by : Jazmin Pena, ; Nov 21 2016 10:12AM EST                       (Author)

## 2018-01-10 NOTE — MISCELLANEOUS
Provider Comments  Provider Comments:   Dear Onur Francisco,    You missed your appointment with Dr Kristine Forbes on 01/06/2017; please contact our office to reschedule at [phone]  We understand that many situations arise that occasionally prevent patients from keeping scheduled appointments  It is the policy of [address] [place] [address] [facility] that patients notify us 24 hours in advance if unable to keep a scheduled appointment  Missed appointments jeopardize strong physician-patient relationships  The appointment you missed could have easily been made available to another patient if you had contacted us to cancel  We like to accommodate all of our patients, but when patients miss an appointment it prevents us from being able to help everyone  In the future, we request at least 24 hours' notice of cancellation so we can make your appointment available to someone else in need       Sincerely,  The Physicians and Staff at [facility]          Signatures   Electronically signed by : Raeann Sunshine, ; Jan 6 2017  2:16PM EST                       (Author)

## 2018-01-13 VITALS
BODY MASS INDEX: 24.34 KG/M2 | SYSTOLIC BLOOD PRESSURE: 120 MMHG | WEIGHT: 170 LBS | HEIGHT: 70 IN | HEART RATE: 92 BPM | DIASTOLIC BLOOD PRESSURE: 80 MMHG

## 2018-01-13 VITALS
DIASTOLIC BLOOD PRESSURE: 82 MMHG | RESPIRATION RATE: 16 BRPM | WEIGHT: 185.31 LBS | OXYGEN SATURATION: 93 % | TEMPERATURE: 98.3 F | HEART RATE: 93 BPM | SYSTOLIC BLOOD PRESSURE: 156 MMHG | BODY MASS INDEX: 26.98 KG/M2

## 2018-01-13 VITALS
BODY MASS INDEX: 26.63 KG/M2 | OXYGEN SATURATION: 91 % | TEMPERATURE: 99 F | DIASTOLIC BLOOD PRESSURE: 80 MMHG | RESPIRATION RATE: 16 BRPM | HEART RATE: 114 BPM | SYSTOLIC BLOOD PRESSURE: 110 MMHG | WEIGHT: 186 LBS | HEIGHT: 70 IN

## 2018-01-13 VITALS
DIASTOLIC BLOOD PRESSURE: 80 MMHG | HEIGHT: 70 IN | WEIGHT: 181 LBS | BODY MASS INDEX: 25.91 KG/M2 | SYSTOLIC BLOOD PRESSURE: 120 MMHG | OXYGEN SATURATION: 90 % | TEMPERATURE: 98.2 F | HEART RATE: 108 BPM | RESPIRATION RATE: 16 BRPM

## 2018-01-13 VITALS
BODY MASS INDEX: 27.63 KG/M2 | HEIGHT: 70 IN | OXYGEN SATURATION: 95 % | WEIGHT: 193 LBS | RESPIRATION RATE: 16 BRPM | SYSTOLIC BLOOD PRESSURE: 140 MMHG | TEMPERATURE: 96.8 F | DIASTOLIC BLOOD PRESSURE: 82 MMHG | HEART RATE: 90 BPM

## 2018-01-14 NOTE — MISCELLANEOUS
Message  spoke with patient's wife on the phone  He is doing well with the jejunostomy tube  gaining weight  LFTs improved  Dr Nathalie Lepe note appreciated  Plan is to start chemo and radiation followed by surgery  I will cancel the 4/27 appointment and schedule for the first week of May  f/u LFts then  Signatures   Electronically signed by :  Kamila Griffin MD; Apr 15 2016 12:18PM EST                       (Author)

## 2018-01-14 NOTE — MISCELLANEOUS
Message  Patient's wife called stating that he has been having more pain while receiving chemotherapy recently and that he is almost out of oxycodone  Last script given 2 weeks ago  Agreeable to long-acting  Will start morphine ER 15mg TID and refill oxycodone  Wife will  scripts        Plan  Chronic neoplasm-related pain, Esophageal cancer    · Morphine Sulfate ER 15 MG Oral Tablet Extended Release; TAKE 1 TABLET Every  8 hours  Esophageal cancer    · OxyCODONE HCl - 5 MG/5ML Oral Solution; SWALLOW 10 ML Every 4 hours  PRN pain    Signatures   Electronically signed by : CORAL Villa; Oct 20 2016  6:06PM EST                       (Author)

## 2018-01-14 NOTE — MISCELLANEOUS
Message  Return to work or school:   Biagio Closs is under my professional care  He was seen in my office on 2/5/16   He is able to return to work on  2/8/16            Signatures   Electronically signed by :  Vj Olmstead MD; Feb 5 2016 10:14AM EST                       (Author)

## 2018-01-14 NOTE — MISCELLANEOUS
Provider Comments  Provider Comments:   Dear Rustam Martin missed your appointment with Dr Juan Vanegas on 11-21-16  Please call our office to reschedule at [phone]        Thank you,   [facility]      Signatures   Electronically signed by : David Obrien MD; Nov 27 2016  9:50PM EST                       (Author)

## 2018-01-15 NOTE — MISCELLANEOUS
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

## 2018-01-15 NOTE — PROGRESS NOTES
Assessment    1  GERD (gastroesophageal reflux disease) (848 80) (K21 9)    Plan  GERD (gastroesophageal reflux disease)    · Famotidine 20 MG Oral Tablet; Take 1 twice daily   · Pantoprazole Sodium 40 MG Oral Tablet Delayed Release; Take one tablet once  daily   · *1 -  [facility] Physician Referral  Consult  Status: Hold  For - Scheduling  Requested for: 65DVH0152  Care Summary provided  : Yes    Discussion/Summary    Await GI eval, see how pt does on meds  Chief Complaint  BURNING SENSATION THAT COMES UP THROAT FOR ABOUT 3 WEEK  [address] FEELS SICK TO HIS STOMACH AFTER EATING-no meds      History of Present Illness  Abdominal Pain:   Silvina Ny presents with complaints of sudden onset of intermittent episodes of mild epigastric abdominal pain (stress at work, does have hx ulcer)   Associated symptoms include nausea and vomiting, but no anorexia and no weight loss  Review of Systems    Constitutional: feeling poorly, but not feeling tired  ENT: stuffy nose, but no earache and no nasal discharge  Respiratory: no cough  Gastrointestinal: nausea and burning in throat  Genitourinary: no dysuria  Neurological: no headache  Past Medical History    1  History of No known problems  Active Problems And Past Medical History Reviewed: The active problems and past medical history were reviewed and updated today  Family History    1  Family history of Coronary artery disease with angina pectoris, unspecified vessel or   lesion type, unspecified whether native or transplanted heart   2  Family history of hypertension (V17 49) (Z82 49)    3  Family history of non-Hodgkin's lymphoma (V16 7) (Z80 7)    4  Family history of Asthma with status asthmaticus, unspecified asthma severity  Family History Reviewed: The family history was reviewed and updated today         Social History    · Denied: History of Alcohol use   · Former smoker (V15 82) (G21 897)   · Full-time employment   ·    · One child  The social history was reviewed and updated today  Surgical History    1  History of Appendectomy Incidental  Surgical History Reviewed: The surgical history was reviewed and updated today  Current Meds   1  No Reported Medications Recorded    The medication list was reviewed and updated today  Allergies    1  Augmentin    Vitals   Recorded: 32YSD3393 09:37AM   Temperature 99 2 F   Heart Rate 85   Systolic 366   Diastolic 88   Height 5 ft 9 5 in   Weight 205 lb    BMI Calculated 29 84   BSA Calculated 2 1     Physical Exam    Constitutional   General appearance: Abnormal   uncomfortable, overweight and appears tired  Ears, Nose, Mouth, and Throat   Otoscopic examination: Tympanic membrance translucent with normal light reflex  Canals patent without erythema  Oropharynx: Normal with no erythema, edema, exudate or lesions  Pulmonary   Auscultation of lungs: Clear to auscultation, equal breath sounds bilaterally, no wheezes, no rales, no rhonci  Cardiovascular   Auscultation of heart: Normal rate and rhythm, normal S1 and S2, without murmurs  Abdomen   Abdomen: Non-tender, no masses  Message  Return to work or school:   Александр Crew is under my professional care  He was seen in my office on 2/5/16   He is able to return to work on  2/8/16            Signatures   Electronically signed by :  Yadira Cano MD; Feb 5 2016 10:14AM EST                       (Author)

## 2018-01-15 NOTE — MISCELLANEOUS
Message  Received call from patient's wife Kenzie Nieves stating that pt has now run out of oxycodone oral solution  Has been having more pain related to need for feeding tube exchange done last week in IR and has been taking more oxycodone than prescribed  Will also run out of OxyContin a few days early prior to appointment here in one week     Prescriptions will be picked up by wife Kenzie Nieves tomorrow 2/2:  - OxyContin, increasing to 20mg every 8 hours - full month given  - oxycodone 10mg tabs - 1 q 4h PRN (may not be filled due to insurance) - bridge script  - morphine IR 15mg tabs - 1 q 4h PRN (only to be filled if cannot fill oxycodone IR tabs) - bridge script      Plan  Chronic neoplasm-related pain    · From  OxyCODONE HCl ER 20 MG Oral Tablet ER 12 Hour Abuse-Deterrent  TAKE 1 TABLET EVERY 12 HOURS To OxyCODONE HCl ER 20 MG Oral Tablet ER 12  Hour Abuse-Deterrent TAKE 1 TABLET Every 8 hours  Chronic neoplasm-related pain, Esophageal cancer    · Morphine Sulfate 15 MG Oral Tablet; TAKE 1 TABLET Every 4 hours PRN pain   · OxyCODONE HCl - 10 MG Oral Tablet; TAKE 1 TABLET Every 4 hours PRN pain  Esophageal cancer    · OxyCODONE HCl - 5 MG/5ML Oral Solution    Signatures   Electronically signed by : CORAL Aguayo; Feb 1 2017  4:24PM EST                       (Author)

## 2018-01-15 NOTE — MISCELLANEOUS
Message  Patient unable to fill oxycodone until next week due to insurance and is almost out  Will provide script for one week of short-acting morphine to use until he is able to fill oxycodone  Discussed via phone with patient        Plan  Chronic neoplasm-related pain, Esophageal cancer    · Morphine Sulfate 20 MG/5ML Oral Solution; Swallow 3 75mL (15mg) every 4 hours  as needed for pain    Signatures   Electronically signed by : CORAL Pereyra; Nov 7 2016 10:52AM EST                       (Author)

## 2018-01-16 NOTE — MISCELLANEOUS
Assessment    1  Cerebrovascular accident (CVA) (434 91) (I63 9)    Plan  Anxiety    · DiazePAM 5 MG Oral Tablet; 1po tid prn   Rx By: Paulino Sanches; Dispense: 30 Days ; #:60 Tablet; Refill: 2; For: Anxiety; FAY = N; Print Rx  Esophageal cancer    · LevoFLOXacin 500 MG Oral Tablet   Rx By: Hiral Perez; Dispense: 7 Days ; #:7 Tablet; Refill: 0; For: Esophageal cancer; FAY = N; Transmitted To: [address] [place] [address]; Last Updated By: Paulino Sanches; 2/6/2017 9:28:50 AM  Exposure to influenza    · Oseltamivir Phosphate 75 MG Oral Capsule (Tamiflu)   Rx By: Paulino Sanches; Dispense: 5 Days ; #:5 Capsule; Refill: 1; For: Exposure to influenza; FAY = N; Transmitted To: [place] [address]  Hyperlipemia    · From  Atorvastatin Calcium 40 MG Oral Tablet TAKE 1 TABLET DAILY To  Atorvastatin Calcium 40 MG Oral Tablet TAKE 1/2 TABLET DAILY   Rx By: Paulino Sanches; Dispense: 30 Days ; #:30 Tablet; Refill: 3; For: Hyperlipemia; FAY = N; Record    Discussion/Summary  Discussion Summary:   Starting speech therapy tomorrow, will need more than 4 PT visits  Chief Complaint  Chief Complaint Free Text Note Form: f/u TIA/CVA and pneumonia      History of Present Illness  TCM Communication [facility]: He was hospitalized at [facility]  The date of admission: 02/14/17, date of discharge: 02/23/17  He was discharged to home, on 2 liters of O2  He scheduled a follow up appointment  Symptoms: weakness, dizziness and fatigue  The patient is currently experiencing symptoms  anxiety,slurred speech Referrals Needed:  from house call made appt with neuro and pulmonary  Communication performed and completed by Barbara Miranda      Review of Systems  Complete-Male:   Constitutional: feeling poorly  Cardiovascular: no chest pain  Respiratory: cough  Gastrointestinal: abdominal pain, but no nausea  Neurological: limb weakness, difficulty walking and speech slurring  Psychiatric: anxiety  Active Problems    1   Abnormal liver enzymes (790 5) (R74 8)   2  Acid reflux (530 81) (K21 9)   3  Anxiety (300 00) (F41 9)   4  Chemotherapy-induced peripheral neuropathy (357 7,E933 1) (G62 0,T45  1X5A)   5  Chronic neoplasm-related pain (338 3) (G89 3)   6  Constipation due to opioid therapy (564 09,E935 2) (K59 03,T40 2X5A)   7  Esophageal cancer (150 9) (C15 9)   8  Esophageal ulcer (530 20) (K22 10)   9  Exposure to influenza (V01 79) (Z20 828)   10  Fatigue (780 79) (R53 83)   11  Hyperlipemia (272 4) (E78 5)   12  Insomnia due to medical condition (327 01) (G47 01)   13  Muscle weakness (generalized) (728 87) (M62 81)   14  On antineoplastic chemotherapy (V58 69) (N86 998)    Past Medical History    1  History of alcohol abuse (305 03) (Z87 898)   2  History of chemotherapy (V87 41) (Z92 21)   3  History of nausea (V12 79) (Z87 898)   4  History of radiation therapy (V15 3) (Z92 3)   5  History of vomiting (V13 89) (Z87 898)   6  History of No known problems    Surgical History    1  History of Appendectomy Incidental   2  History of Diagnostic Esophagogastroduodenoscopy   3  History of J-Tube (___ [facility])  Surgical History Reviewed: The surgical history was reviewed and updated today  Family History  Mother    1  Family history of Coronary artery disease with angina pectoris, unspecified vessel or   lesion type, unspecified whether native or transplanted heart   2  Family history of hypertension (V17 49) (Z82 49)  Father    3  Family history of non-Hodgkin's lymphoma (V16 7) (Z80 7)  Son    4  Family history of Asthma with status asthmaticus, unspecified asthma severity  Family History Reviewed: The family history was reviewed and updated today  Social History    · Denied: History of Alcohol use   · Denied: Drug use (305 90) (F19 90)   · Former smoker (V10 38) (X29 556)   · Full-time employment   ·    · One child  Social History Reviewed: The social history was reviewed and updated today  Current Meds   1  Atorvastatin Calcium 40 MG Oral Tablet; TAKE 1 TABLET DAILY; Therapy: 73QAF3236 to (Evaluate:24Jun2017)  Requested for: 38WZA5675; Last   Rx:24Feb2017 Ordered   2  Carafate 1 GM/10ML Oral Suspension; TAKE 10 ML EVERY 12 HOURS DAILY  Take on   empty stomach 1 hour before or 2 hours after meals; Therapy: 88CRR6060 to (Mona Jordan)  Requested for: 62AKV1008; Last   Rx:06Jan2017 Ordered   3  DiazePAM 2 MG Oral Tablet; TAKE 1 TABLET Twice daily PRN anxiety or muscle spasms; Therapy: 04JHP2569 to (Evaluate:10Mar2017); Last Rx:08Feb2017 Ordered   4  DiphenhydrAMINE HCl - 12 5 MG/5ML Oral Elixir; TAKE 5 ML Bedtime; Therapy: 82BZK0120 to (Evaluate:12Apr2017)  Requested for: 11HFJ4833; Last   Rx:06Jan2017 Ordered   5  Famotidine 20 MG Oral Tablet; take 1 tablet by mouth twice a day; Therapy: 37NRX9378 to (Nabeel Prim)  Requested for: 12SJR1165; Last   Rx:08Feb2017 Ordered   6  Gabapentin 300 MG Oral Capsule; Take 1 capsule in morning, 1 capsule in afternoon,   and two capsules at bedtime; Therapy: 43DIT5548 to (Evaluate:10Mar2017)  Requested for: 03SQQ4482; Last   Rx:08Feb2017 Ordered   7  Lactulose 20 GM/30ML Oral Solution; SWALLOW 30 ML Twice daily; Therapy: 21OXN3201 to (Evaluate:00Klf4541)  Requested for: 42NOP2374; Last   Rx:06Jan2017 Ordered   8  LevoFLOXacin 500 MG Oral Tablet; TAKE 1 TABLET DAILY UNTIL FINISHED; Therapy: 33VVM3968 to (Evaluate:68Dwg1774)  Requested for: 85ZPX1386; Last   Rx:06Feb2017 Ordered   9  Lidocaine-Prilocaine 2 5-2 5 % External Cream; APPLY 1 INCH TO IV SITE PRIOR TO   CHEMO TREATMENT; Therapy: 94Hus9342 to (Last Rx:25Apr2016)  Requested for: 12Owl2518 Ordered   10  Melatonin 5 MG Oral Tablet; TAKE AS DIRECTED; Therapy: 78UFR9130 to Recorded   11  Oseltamivir Phosphate 75 MG Oral Capsule; 1 po q day; Therapy: 88UHI6694 to (Evaluate:63Ekw0487)  Requested for: 44GJM1316; Last    Rx:01Feb2017 Ordered   12   OxyCODONE HCl - 20 MG Oral Tablet; TAKE 1 TABLET Every 4 hours PRN pain; Therapy: 31OKR3587 to (Evaluate:15Ffw3639); Last Rx:02Rza1289 Ordered   13  OxyCODONE HCl ER 20 MG Oral Tablet ER 12 Hour Abuse-Deterrent; Take 2 tablets in    the morning and 1 tablet at bedtime; Therapy: 31DGF1395 to (Evaluate:10Mar2017); Last Rx:84Huz7985 Ordered   14  Polyethylene Glycol 3350 Oral Powder; MIX 17 GM Twice daily; Therapy: 98FWS3040 to (Evaluate:09Mar2017) Recorded   15  Senna 8 6 MG Oral Tablet; take 2 tablet twice daily; Therapy: 51Cxp8623 to ([facility])  Requested for: 73ZDG0947; Last    OH:31VVU8623 Ordered  Medication List Reviewed: The medication list was reviewed and updated today  Allergies    1  Tagamet TABS    Vitals  Signs   Recorded: 02YLV3257 04:27PM   Heart Rate: 92  Systolic: 714  Diastolic: 80  Height: 5 ft 10 in  Weight: 170 lb   BMI Calculated: 24 39  BSA Calculated: 1 95    Physical Exam    Constitutional   General appearance: Abnormal   chronically ill, within normal limits of ideal weight and appears tired  Pulmonary   Auscultation of lungs: Abnormal   rales/crackles over both bases  Cardiovascular   Auscultation of heart: Normal rate and rhythm, normal S1 and S2, without murmurs  Examination of extremities for edema and/or varicosities: Normal     Abdomen   Abdomen: Abnormal   The abdomen was distended  The abdomen was soft and nontender  Additional Exam:  speech difficult to understand  Future Appointments    Date/Time Provider Specialty Site   04/17/2017 03:00 PM Marylen Riding, MD Neurology [place] [address]   03/21/2017 03:40 PM Linnea Vyas MD Pulmonary Medicine [address] [facility] [place]   03[facility]08[facility]2017 08:40 [facility]     Signatures   Electronically signed by :  Stephan Boone MD; Mar  2 2017  4:32PM EST                       (Author)

## 2018-01-16 NOTE — PROCEDURES
Procedures by FABRICIO Peoples at  2/22/2017 12:14 PM      Author:  FABRICIO Peoples Service:  (none) Author Type:  Speech and Language Pathologist     Filed:  2/22/2017 12:14 PM Date of Service:  2/22/2017 12:14 PM Status:  Signed     :  FABRICIO Peoples (Speech and Language Pathologist)                                               Video Swallow Study      Patient Name: Lottie Davis  Today's Date: 2/22/2017  par  par  Past Medical History  Past Medical History      Diagnosis   Date    Acute CVA (cerebrovascular accident)  2/17/2017    Anxiety      Chronic pain due to neoplasm      Esophageal cancer  03/2016     squamous cell, stage iv     GERD (gastroesophageal reflux disease)      Neuropathy due to chemotherapeutic drug       par  Past Surgical History  Past Surgical History       Procedure   Laterality Date    Appendectomy       Colon surgery        bowel resection      Esophagogastroduodenoscopy  N/A 3/14/2016     Procedure: ESOPHAGOGASTRODUODENOSCOPY (EGD); Surgeon: Edel Newby MD;  [facility]; Service:       Colonoscopy  N/A 3/14/2016     Procedure: COLONOSCOPY;  Surgeon: Edel Newby MD;  [facility]; Service:       Tonsillectomy       Pr esophagogastroduodenoscopy transoral diagnostic  N/A 3/18/2016     Procedure: EGD-PEDIATRIC SCOPE ;  Surgeon: Dione Neville DO;  [facility]; Service: Gastroenterology      Abdominal surgery       Gastrojejunostomy w/ jejunostomy tube  N/A 4/5/2016     Procedure: INSERTION JEJUNOSTOMY TUBE OPEN;  Surgeon: Mirta Mallory MD;  [facility];  Service:      Kalani Reyes Tunneled venous port placement  Left 4/5/2016     Procedure: INSERTION VENOUS PORT (PORT-A-CATH); Surgeon: Mirta Mallory MD;  [facility];  Service:       Pr esophagogastroduodenoscopy transoral diagnostic  N/A 10/3/2016     Procedure: ESOPHAGOGASTRODUODENOSCOPY (EGD); Surgeon: Ayaka Hernandez MD;  [facility];   Service: Gastroenterology           General Information:         Oral Stage:                Pharyngeal Stage:                     Esophageal Stage:          Assessment Summary:       Diagnosis/Prognosis:            Recommendations:                              Received for:Provider  [facility]   Feb 22 2017 12:14PM [facility] Standard Time

## 2018-01-16 NOTE — RESULT NOTES
Verified Results  * CT ABDOMEN PELVIS W CONTRAST [id number] 08:33AM Jeffy Minaya     Test Name Result Flag Reference   CT ABDOMEN PELVIS W CONTRAST (Report)     CT ABDOMEN AND PELVIS WITH IV CONTRAST     INDICATION: Nausea and vomiting  Previous appendectomy  COMPARISON: None  TECHNIQUE: CT examination of the abdomen and pelvis was performed after the administration of intravenous contrast  This examination, like all CT scans performed in the [facility], was performed utilizing techniques to minimize    radiation dose exposure, including the use of iterative reconstruction and automated exposure control  Axial, sagittal, and coronal reformatted images were submitted for interpretation  100 cc of intravenous Omnipaque 350 was administered for this    examination  Enteric contrast was not given  FINDINGS:     ABDOMEN     LOWER CHEST: Scarring noted at the left lung base  LIVER/BILIARY TREE: Mild fatty infiltration noted  GALLBLADDER: No calcified gallstones  No pericholecystic inflammatory change  SPLEEN: Unremarkable  PANCREAS: Unremarkable  ADRENAL GLANDS: Unremarkable  KIDNEYS/URETERS: Unremarkable  No hydronephrosis  STOMACH AND BOWEL: Small sliding-type hiatal hernia noted  APPENDIX: No findings to suggest appendicitis  ABDOMINOPELVIC CAVITY: No ascites or free intraperitoneal air  No lymphadenopathy  VESSELS: Unremarkable for patient's age  PELVIS     REPRODUCTIVE ORGANS: Unremarkable for patient's age  URINARY BLADDER: Unremarkable  ABDOMINAL WALL/INGUINAL REGIONS: Unremarkable  OSSEOUS STRUCTURES: No acute fracture or destructive osseous lesion  IMPRESSION:   1  No acute infectious or inflammatory process  2  Small sliding-type hiatal hernia noted         Workstation performed: [id number]     Signed by:   Kathie Hercules MD   3/10/16       Plan  Abnormal liver enzymes    · (1) ALPHA 1 ANTITRYPSIN; Status:Active; Requested for:10Mar2016;    · (1) MANISH SCREEN W/REFLEX TO TITER/PATTERN; Status:Active; Requested  for:10Mar2016;    · (1) CERULOPLASMIN; Status:Active; Requested for:10Mar2016;    · (1) FERRITIN; Status:Active; Requested for:10Mar2016;    · (1) HEP A AB, IGM; Status:Active; Requested for:10Mar2016;    · (1) HEP A AB,TOTAL; Status:Active; Requested for:10Mar2016;    · (1) HEP B CORE AB, TOTAL; Status:Active; Requested for:10Mar2016;    · (1) HEP B SURFACE ANTIBODY; Status:Active; Requested for:10Mar2016;    · (1) HEP B SURFACE ANTIGEN; Status:Active; Requested for:10Mar2016;    · (1) HEP BE ANTIBODY; Status:Active; Requested for:10Mar2016;    · (1) HEP BE ANTIGEN; Status:Active; Requested for:10Mar2016;    · (1) HEP C ANTIBODY; Status:Active; Requested for:10Mar2016;    · (1) HEP C RNA PCR, QUANTITATIVE; Status:Active; Requested for:10Mar2016;    · (1) IRON SATURATION %, TIBC; Status:Active; Requested for:10Mar2016;    · (1) IRON; Status:Active; Requested for:10Mar2016;    · (1) MITOCHONDRIAL ANTIBODY; Status:Active; Requested for:10Mar2016;    · (1) PT WITH INR; Status:Active; Requested for:10Mar2016;    · * MRI ABDOMEN WO CONTRAST AND MRCP; Status:Need Information - Financial  Authorization; Requested for:10Mar2016;   Acid reflux    · (1) SMOOTH MUSCLE ANTIBODY; Status:Active;  Requested for:10Mar2016;

## 2018-01-16 NOTE — PROCEDURES
Procedures by FABRICIO Lu at  2/22/2017 11:35 AM      Author:  FABRICIO Lu  Service:  (none) Author Type:  Speech and Language Pathologist     Filed:  2/22/2017 12:16 PM  Date of Service:  2/22/2017 11:35 AM Status:  Addendum     :  FABRICIO Lu (Speech and Language Pathologist)        Related Notes: Original Note by FABRICIO uL (Speech and Language Pathologist) filed at 2/22/2017 12:08 PM                                               Video Swallow Study      Patient Name: Max Nielson  Today's Date: 2/22/2017        Past Medical History  Past Medical History      Diagnosis   Date    Acute CVA (cerebrovascular accident)  2/17/2017    Anxiety      Chronic pain due to neoplasm      Esophageal cancer  03/2016     squamous cell, stage iv     GERD (gastroesophageal reflux disease)      Neuropathy due to chemotherapeutic drug          Past Surgical History  Past Surgical History       Procedure   Laterality Date    Appendectomy       Colon surgery        bowel resection      Esophagogastroduodenoscopy  N/A 3/14/2016     Procedure: ESOPHAGOGASTRODUODENOSCOPY (EGD); Surgeon: Rafael Calloway MD;  [facility]; Service:       Colonoscopy  N/A 3/14/2016     Procedure: COLONOSCOPY;  Surgeon: Rafael Calloway MD;  [facility]; Service:       Tonsillectomy       Pr esophagogastroduodenoscopy transoral diagnostic  N/A 3/18/2016     Procedure: EGD-PEDIATRIC SCOPE ;  Surgeon: Jeni Armstrong DO;  [facility]; Service: Gastroenterology      Abdominal surgery       Gastrojejunostomy w/ jejunostomy tube  N/A 4/5/2016     Procedure: INSERTION JEJUNOSTOMY TUBE OPEN;  Surgeon: Yaz Anderson MD;  [facility];  Service:      Thalia Watson Tunneled venous port placement  Left 4/5/2016     Procedure: INSERTION VENOUS PORT (PORT-A-CATH);   Surgeon: Yaz Anderson MD;  [facility];  Service:       Pr esophagogastroduodenoscopy transoral diagnostic  N/A 10/3/2016 Procedure: ESOPHAGOGASTRODUODENOSCOPY (EGD); Surgeon: Kathy Suazo MD;  [facility]; Service: Gastroenterology           Please refer to admit info and other notes for pmh and current status  Previous VBS:  None known  Current Diet:  Regular w/ J tube feedings at night  Premorbid diet:  same  Dentition:  Edentyulous  Dentures are at home  O2 requirement:  None this am,  Needs o2 w/ exertion  Oral mech:  Strength and ROM:  wnl  Vocal Quality/Speech:  Speech clear but pt has some receptive and expressive aphasia  New cva  Cognitive status:  Alert and cooperative  Consistencies administered: Barium laden applesauce,soft solid, hard solid, bite of turkey sandwich,  thin liquids, 13mm barium pill in thin liquid    Liquids were administered by straw  Pt was seated laterally at 90 degrees  Oral stage:  WNL/WFL despite being edentulous    Pharyngeal stage: WNL  Swallow promptness: wnl  Epiglottic inversion: adequate/complete  Laryngeal rise: wnl  Pharyngeal constriction: wnl  Vallecular retention: none  Pyriform retention:none  PPW coating:none  Transient penetration: x 1 w/ thin  Epiglottic undercoat:none  Penetration:none  Aspiration:none  Screening of Esophageal stage:  WFL  Some stasis noted following solids  Cleared w/ liquid  Summary:  No pharyngeal retention, penetration, or aspiration this study  Esophageal stage was wfl  Solid stasis cleared w/ liquids  No retropulsion noted w/ this  sample size  (? May increase at a meal)  Pill cleared wnl  Recommendations:  Diet:regular  J tube as needed  Liquids:thin  Meds:as tolerated  Strategies: sips in between bites  Slow rate  Upright position  F/u ST tx: does not appear indicated at this time     Intermittent Supervision  Aspiration Precautions  Reflux Precautions  Consider consult with:   Results reviewed with: pt, family, physician, nurse    If a dedicated assessment of the esophagus is desired, consider esophagram/barium swallow or egd                           Received for:Provider  [facility]   Feb 22 2017 12:16PM [facility] Standard Time

## 2018-01-16 NOTE — MISCELLANEOUS
Message   Recorded as Task   Date: 12/30/2016 01:14 PM, Created By: Huong Licona   Task Name: Intake   Assigned To: Zaire Spann   Regarding Patient: Krishna Alexander, Status: Complete   Comment:    Falguni Jurado - 30 Dec 2016 1:14 PM     TASK CREATED  Caller: Inga-Wife; Other  Inga called- Jamaal Joiner will not make it with his Liquid oxycodone  till his follow up apt here in clinic  They will not fill it till the 15th of Jan at the pharmacy  So she would like to  a bridge RX for oxy pills to get him to his next apt  He cannot use morphine liquid because of constipation  She is willing to come in tuesday 1/3/16 for thr RX  Mendy Cruz - 03 Jan 2017 8:05 AM     TASK REASSIGNED: Previously Assigned To Jaime Santo  Please address  Pt has no more pain medication at all  Bernadette Jimenez also states he hasn't been able to eat and has had diarrhea x2days  Zaire Spann - 98 Jan 2017 9:41 AM     TASK COMPLETED   Patient's wife calling in that patient has run out of his oxycodone several days ago and won't be able to fill until 1/15 per insurance  He has not been able to tolerate morphine ER due to constipation  At last visit was given bridge script of morphine oral concentrate solution for same issue of running out of oxycodone early  He filled oxycodone (meant to last one month) about 3 weeks ago  States he has been taking more due to increased pain with most recent round of chemotherapy  Has not been feeling well for past four days, since he ran out  Over past two days has had diarrhea  Has not been able to eat much although has been tolerating his tube feeds and has not been vomiting  No fever or chills but has been "feeling cold" lately  Today bridge script for morphine oral concentrate given again, to last until he is able to get usual oxycodone oral solution filled on 1/15  That script will be provided at his scheduled visit later this week   Diarrhea may be related to opioid withdrawal as he has not had any over past few days        Plan  Chronic neoplasm-related pain, Esophageal cancer    · Morphine Sulfate 20 MG/5ML Oral Solution; Swallow 3 75mL (15mg) every 4  hours as needed for pain    Signatures   Electronically signed by : CORAL Rincon; Herb  3 2017  4:34PM EST                       (Author)

## 2018-01-17 NOTE — RESULT NOTES
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 with at least   superficial lamina propria invasion    - See note  Note: Immunostain demonstrate lesional cells to be positive for   pancytokeratin, p63 and CK5/6  PAS/AB is negative for intestinal   metaplasia  This is an unusual location for a squamous cell carcinoma, however given   the limited sampling the findings may represent a sampling of larger   lesion with a component of squamous cell carcinoma  Clinical correlation   is needed  Appropriate positive and negative controls obtained  Intradepartmental consultation is in agreement with the above diagnosis   (LS)  Case discussed with Dr Zoila Adhikari on 3/29/16 @ 3:05 PM by Dr Chirag Lemos  Interpretation performed at [facility], [address] [place] [address] Drive [facility] 83 (Report)     These tests were developed and their performance characteristics   determined by [address] [place] [address] [facility] or [facility]  They may not be cleared or approved by the [place]  Food and   Drug Administration  The FDA has determined that such clearance or   approval is not necessary  These tests are used for clinical purposes  They should not be regarded as investigational or for research   This   laboratory has been approved by CLIA 88, designated as a high-complexity   laboratory and is qualified to perform these tests  LAB AP GROSS DESCRIPTION (Report)     A  The specimen is received in formalin, labeled with the patient's name   and hospital number, and is designated stomach gastric cardia  The   specimen consists of multiple tan-pink soft to friable tissue fragments   measuring in aggregate 0 8 by 0 7 by 0 2 centimeters  Entirely submitted  One cassette  Note: The estimated total formalin fixation time based upon information   provided by the submitting clinician and the standard processing schedule   is over 72 hours  MAS   LAB AP CLINICAL INFORMATION (Report)     Nodularity  Distal GE junction stricture  Dilated with serial   dilations  Then a coreflo feeding tube was placed with endoscopic   guidance  Nodular area at the GE junction and at the distal part of the   GE junction at 40 cm  Biopsied  Endoscopically is suspicious for GE   junction cancer  Normal rest of the stomach  Normal 1st portion of the   duodenum and 2nd portion of the duodenum  Discussion/Summary   Spoke to pt  Biopsies confirmed squamous cell cancer which is odd given the location at the GE junction  He is already referred to CT surgery and has appt next week

## 2018-01-17 NOTE — MISCELLANEOUS
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 01) (G47 01)   2  Anxiety (300 00) (F41 9)   3  Esophageal cancer (150 9) (C15 9)    Plan  Anxiety    · ALPRAZolam 0 5 MG Oral Tablet Dispersible; TAKE 1 TABLET 3 TIMES DAILY AS  NEEDED   Rx By: Kylah Paez; Dispense: 30 Days ; #:60 Tablet Dispersible; Refill: 3; For: Anxiety; FAY = N; Print Rx  Insomnia due to medical condition    · DiphenhydrAMINE HCl - 12 5 MG/5ML Oral Liquid; 2-3 teaspoons qhs and 1  teaspoon prn   Rx By: Kylah Paez; Dispense: 30 Days ; #:2 X 120 ML Bottle; Refill: 2; For: Insomnia due to medical condition; FAY = N; Sent To: [facility] [id number]    Discussion/Summary  Discussion Summary:   Trial liquid benadryl to sleep  Chief Complaint  Chief Complaint Free Text Note Form: ANNMARIE esophageal cancer      History of Present Illness  TCM Communication [facility]: The patient is being contacted for follow-up after hospitalization  Hospital records were reviewed  He was hospitalized at [facility]  The date of admission: 03/18/2016, date of discharge: 03/24/2016  Diagnosis: dysphagia  He was discharged to home  Medications were not reviewed today  He scheduled a follow up appointment  Follow-up appointments with other specialists: 04/01/2016  Symptoms: vomiting and loose stools, but no fever, no weakness, no dizziness, no headache, no fatigue, no cough, no shortness of breath, no chest pain, no back pain on left side, no back pain on right side, no arm pain left side, no arm pain on right side, no leg pain on left side, no leg pain on right side, no upper abdominal pain, no middle abdominal pain, no lower abdominal pain, no rash:, no anorexia, no nausea, no constipation, no pain with urinating, no incisional pain, no wound drainage and no swelling  on liquid diet can not eat solids Counseling was provided to the patient  Communication performed and completed by TR      Active Problems     1   Abnormal liver enzymes (790 5) (R74 8)   2  Acid reflux (530 81) (K21 9)    Nausea (787 02) (R11 0)       Vomiting (787 03) (R11 10)          Past Medical History    1  History of No known problems    Surgical History    1  History of Appendectomy Incidental  Surgical History Reviewed: The surgical history was reviewed and updated today  Family History    1  Family history of Coronary artery disease with angina pectoris, unspecified vessel or   lesion type, unspecified whether native or transplanted heart   2  Family history of hypertension (V17 49) (Z82 49)    3  Family history of non-Hodgkin's lymphoma (V16 7) (Z80 7)    4  Family history of Asthma with status asthmaticus, unspecified asthma severity  Family History Reviewed: The family history was reviewed and updated today  Social History    · Denied: History of Alcohol use   · Former smoker (V15 82) (S42 315)   · Full-time employment   ·    · One child  Social History Reviewed: The social history was reviewed and updated today  Current Meds   1  No Reported Medications Recorded  Medication List Reviewed: The medication list was reviewed and updated today  Allergies    1  Augmentin   2  Tagamet TABS    Vitals  Signs [Data Includes: Current Encounter]   Recorded: 01Apr2016 10:31AM   Temperature: 97 4 F  Heart Rate: 97  Systolic: 272  Diastolic: 76  Height: 5 ft 9 5 in  Weight: 159 lb 12 8 oz  BMI Calculated: 23 26  BSA Calculated: 1 89    Physical Exam    Constitutional   General appearance: No acute distress, well appearing and well nourished  Pulmonary   Auscultation of lungs: Clear to auscultation, equal breath sounds bilaterally, no wheezes, no rales, no rhonci  Cardiovascular   Auscultation of heart: Normal rate and rhythm, normal S1 and S2, without murmurs  Abdomen   Abdomen: Non-tender, no masses  Lymphatic   Palpation of lymph nodes in neck: No lymphadenopathy           Future Appointments    Date/Time Provider Specialty Site   04/04/2016 02:15 PM KEELY Verdugo  Thoracic Surgery CT SURGERY LUNG OFFICE   04/08/2016 08:00 AM Leopold Leisure, M D  Hematology Oncology [facility]   04/27/2016 02:30 PM Marci Purcell, [facility] Gastroenterology Adult [facility] 9     Signatures   Electronically signed by :  Michelle Alfonso MD; Apr 1 2016 10:55AM EST                       (Author)